# Patient Record
Sex: FEMALE | Race: WHITE | NOT HISPANIC OR LATINO | Employment: FULL TIME | URBAN - METROPOLITAN AREA
[De-identification: names, ages, dates, MRNs, and addresses within clinical notes are randomized per-mention and may not be internally consistent; named-entity substitution may affect disease eponyms.]

---

## 2019-05-14 ENCOUNTER — TRANSFERRED RECORDS (OUTPATIENT)
Dept: HEALTH INFORMATION MANAGEMENT | Facility: CLINIC | Age: 39
End: 2019-05-14

## 2019-05-21 ENCOUNTER — TRANSFERRED RECORDS (OUTPATIENT)
Dept: HEALTH INFORMATION MANAGEMENT | Facility: CLINIC | Age: 39
End: 2019-05-21

## 2019-06-05 ENCOUNTER — TRANSFERRED RECORDS (OUTPATIENT)
Dept: HEALTH INFORMATION MANAGEMENT | Facility: CLINIC | Age: 39
End: 2019-06-05

## 2019-07-02 ENCOUNTER — TRANSFERRED RECORDS (OUTPATIENT)
Dept: HEALTH INFORMATION MANAGEMENT | Facility: CLINIC | Age: 39
End: 2019-07-02

## 2019-07-05 NOTE — TELEPHONE ENCOUNTER
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 Barry Conner [facility], MRI 05/2019, Xrays 03/2019, DEXA 06/2019   DATE RECEIVED: Jul 22, 2019    NOTES STATUS DETAILS   OFFICE NOTE from referring provider Received 5/21/19 Dr. Conner [facility]   OFFICE NOTE from other specialist N/A    DISCHARGE SUMMARY from hospital N/A    DISCHARGE REPORT from the ER N/A    OPERATIVE REPORT N/A    MEDICATION LIST Received    IMPLANT RECORD/STICKER N/A    LABS     CBC/DIFF N/A    CULTURES N/A    INJECTIONS DONE IN RADIOLOGY N/A    NM BONE Received 6/5/19   MRI Received 5/14/19   CT SCAN N/A    XRAYS (IMAGES & REPORTS) Received 3/18/19   TUMOR     PATHOLOGY  Slides & report N/A      07/05/19   3:26 PM  Called [facility] [place] for Dr. Conner's referral coordinator.     07/08/19   10:30 AM  Dr. Conner did not see this patient at [facility]  3:19 PM received recs from Dr. Conner at [facility]. Called [facility] and requested imaging, they will push

## 2019-07-22 ENCOUNTER — PRE VISIT (OUTPATIENT)
Dept: ORTHOPEDICS | Facility: CLINIC | Age: 39
End: 2019-07-22

## 2019-07-22 ENCOUNTER — OFFICE VISIT (OUTPATIENT)
Dept: ORTHOPEDICS | Facility: CLINIC | Age: 39
End: 2019-07-22
Payer: COMMERCIAL

## 2019-07-22 ENCOUNTER — TELEPHONE (OUTPATIENT)
Dept: ORTHOPEDICS | Facility: CLINIC | Age: 39
End: 2019-07-22

## 2019-07-22 ENCOUNTER — ANCILLARY PROCEDURE (OUTPATIENT)
Dept: GENERAL RADIOLOGY | Facility: CLINIC | Age: 39
End: 2019-07-22
Attending: ORTHOPAEDIC SURGERY
Payer: COMMERCIAL

## 2019-07-22 VITALS — BODY MASS INDEX: 40.48 KG/M2 | HEIGHT: 64 IN | WEIGHT: 237.1 LBS

## 2019-07-22 DIAGNOSIS — M87.00 AVN (AVASCULAR NECROSIS OF BONE) (H): ICD-10-CM

## 2019-07-22 DIAGNOSIS — M87.00 AVN (AVASCULAR NECROSIS OF BONE) (H): Primary | ICD-10-CM

## 2019-07-22 RX ORDER — LORAZEPAM 0.5 MG/1
TABLET ORAL
COMMUNITY
Start: 2018-11-28

## 2019-07-22 RX ORDER — OMEPRAZOLE/SODIUM BICARBONATE 20MG-1.1G
CAPSULE ORAL
COMMUNITY
End: 2023-04-14

## 2019-07-22 RX ORDER — ONDANSETRON 4 MG/1
TABLET, FILM COATED ORAL
COMMUNITY

## 2019-07-22 RX ORDER — PRAZOSIN HYDROCHLORIDE 1 MG/1
CAPSULE ORAL
Refills: 1 | COMMUNITY
Start: 2019-05-22 | End: 2023-04-14

## 2019-07-22 RX ORDER — FLUTICASONE PROPIONATE 50 MCG
SPRAY, SUSPENSION (ML) NASAL EVERY 24 HOURS
COMMUNITY
Start: 2019-01-23 | End: 2023-04-14

## 2019-07-22 RX ORDER — OMEPRAZOLE 40 MG/1
CAPSULE, DELAYED RELEASE ORAL
COMMUNITY

## 2019-07-22 RX ORDER — ALBUTEROL SULFATE 2 MG/5ML
SYRUP ORAL
COMMUNITY
End: 2023-04-14

## 2019-07-22 RX ORDER — VENLAFAXINE 75 MG/1
TABLET ORAL
COMMUNITY
End: 2023-04-14

## 2019-07-22 RX ORDER — FLUOXETINE 40 MG/1
CAPSULE ORAL
COMMUNITY
Start: 2018-10-31 | End: 2023-04-14

## 2019-07-22 RX ORDER — SUCRALFATE 1 G/1
TABLET ORAL
COMMUNITY
End: 2023-04-14

## 2019-07-22 ASSESSMENT — ACTIVITIES OF DAILY LIVING (ADL)
ADL_MEAN: INCOMPLETE
ADL_SUBSCALE_SCORE: INCOMPLETE
ADL_SUM: INCOMPLETE

## 2019-07-22 ASSESSMENT — ENCOUNTER SYMPTOMS
STIFFNESS: 1
CHILLS: 0
DEPRESSION: 1
JOINT SWELLING: 1
BLOOD IN STOOL: 0
NERVOUS/ANXIOUS: 1
WEIGHT GAIN: 0
DECREASED CONCENTRATION: 0
WEIGHT LOSS: 0
INCREASED ENERGY: 1
FATIGUE: 1
BLOATING: 0
RECTAL PAIN: 0
POLYPHAGIA: 0
DIARRHEA: 0
VOMITING: 1
ARTHRALGIAS: 1
HEARTBURN: 1
POLYDIPSIA: 0
BOWEL INCONTINENCE: 0
JAUNDICE: 0
NAUSEA: 1
ABDOMINAL PAIN: 0
NIGHT SWEATS: 1
BACK PAIN: 1
INSOMNIA: 1
ALTERED TEMPERATURE REGULATION: 0
HALLUCINATIONS: 0
PANIC: 1
CONSTIPATION: 0
DECREASED APPETITE: 0
FEVER: 0

## 2019-07-22 ASSESSMENT — MIFFLIN-ST. JEOR: SCORE: 1743.23

## 2019-07-22 NOTE — NURSING NOTE
"Chief Complaint   Patient presents with     Consult     Pt. states that she is here for Bilat. Knee Pain. She states Right > Left Knee. Referring:  WILY REESE        39 year old  1980    Ht 1.63 m (5' 4.17\")   Wt 107.5 kg (237 lb 1.6 oz)   BMI 40.48 kg/m      Pharmacy: [facility] in [place], [place]    Allergies   Allergen Reactions     Codeine Anaphylaxis, Difficulty breathing, Hives, Itching, Other (See Comments), Rash and Shortness Of Breath     Comment: Throt swelling, Description:        Imidazole Antifungals Anaphylaxis     Metronidazole Anaphylaxis, Difficulty breathing, Hives, Itching, Other (See Comments), Rash, Shortness Of Breath and Swelling     Comment: Throat swelling, Description:   Comment: Throat swelling, Description:          Current Outpatient Medications   Medication     albuterol (PROVENTIL/VENTOLIN) 2 MG/5ML syrup     Cholecalciferol (D-5000) 5000 units TABS     Docosahexaenoic Acid (PRENATAL DHA PO)     FLUoxetine (PROZAC) 40 MG capsule     fluticasone (FLONASE) 50 MCG/ACT nasal spray     LORazepam (ATIVAN) 0.5 MG tablet     omeprazole (PRILOSEC) 40 MG DR capsule     omeprazole-sodium bicarbonate  MG CAPS per capsule     ondansetron (ZOFRAN) 4 MG tablet     prazosin (MINIPRESS) 1 MG capsule     Sharps Container (SHARPS-A-GATOR LOCKING BRACKET) MISC     sucralfate (CARAFATE) 1 GM tablet     venlafaxine (EFFEXOR) 75 MG tablet     No current facility-administered medications for this visit.          Questionnaires:    Pts. Apt. Was scheduled under a \"NEW HIP\" Slot so questionnaires populated in for HIP, will assign Knee Forms if applicable.     HOOS Hip Dysfunction & Osteoarthritis Outcome Questionnaire    Hip Dysfunction & Osteoarthritis Outcome Score (HOOS), English Version LK 2.0 (Bridgette Last, Raul MARIN, 2003) 7/22/2019   S1. Do you feel grinding, hear clicking or any other type of noise from your hip? Rarely   S2. Difficulties spreading legs wide apart Mild   S3. " Difficulties to stride out when walking Mild   Symptom Count 3   Symptom Sum Incomplete   Symptom Mean Incomplete   Symptom Subscale Score Incomplete   Pain Count 0   Pain Sum Incomplete   Pain Mean Incomplete   Pain Subscale Score Incomplete   ADL Count 0   ADL Sum Incomplete   ADL Mean Incomplete   ADL Subscale Score Incomplete   Sports/Rec Count 0   Sports/Rec Sum Incomplete   Sports/Rec Mean Incomplete   Sports/Rec Subscale Score Incomplete   QOL Count 0   QOL Sum Incomplete   QOL Mean Incomplete   Quality of Life Subscale Score Incomplete                    Promis 10 Assessment    PROMIS 10 7/22/2019   In general, would you say your health is: Fair   In general, would you say your quality of life is: Good   In general, how would you rate your physical health? Poor   In general, how would you rate your mental health, including your mood and your ability to think? Fair   In general, how would you rate your satisfaction with your social activities and relationships? Good   In general, please rate how well you carry out your usual social activities and roles Good   To what extent are you able to carry out your everyday physical activities such as walking, climbing stairs, carrying groceries, or moving a chair? Moderately   How often have you been bothered by emotional problems such as feeling anxious, depressed or irritable? Often   How would you rate your fatigue on average? Severe   How would you rate your pain on average?   0 = No Pain  to  10 = Worst Imaginable Pain 7   In general, would you say your health is: 2   In general, would you say your quality of life is: 3   In general, how would you rate your physical health? 1   In general, how would you rate your mental health, including your mood and your ability to think? 2   In general, how would you rate your satisfaction with your social activities and relationships? 3   In general, please rate how well you carry out your usual social activities and roles.  (This includes activities at home, at work and in your community, and responsibilities as a parent, child, spouse, employee, friend, etc.) 3   To what extent are you able to carry out your everyday physical activities such as walking, climbing stairs, carrying groceries, or moving a chair? 3   In the past 7 days, how often have you been bothered by emotional problems such as feeling anxious, depressed, or irritable? 4   In the past 7 days, how would you rate your fatigue on average? 4   In the past 7 days, how would you rate your pain on average, where 0 means no pain, and 10 means worst imaginable pain? 7   Global Mental Health Score 10   Global Physical Health Score 8   PROMIS TOTAL - SUBSCORES 18

## 2019-07-22 NOTE — LETTER
7/22/2019       RE: Krystle Hernández  [address]  [facility] [address]     Dear Colleague,    Thank you for referring your patient, Krystle Hernández, to the [facility]. Please see a copy of my visit note below.    [facility]  Orthopaedic Surgery, Joint Replacement Consultation  by Mario Wu M.D.    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  We explained that the osteonecrosis in her knees is possibly secondary to her annual use of steroids for asthma over the course of many years, versus her rather heavy use of alcohol.  -We discussed the importance of alcohol cessation  -We would recommend limiting the use of steroids as able  -We discussed possible treatment options including both nonoperative and operative management.  We reassured her that her imaging fails to reveal an obvious subchondral fracture or signs of collapse, and therefore, we would currently recommend ongoing nonoperative management.  -We would encourage activity modification as she is able, and to rest when her knees begin to bother her with activity.  -If her symptoms worsen acutely, she should call our clinic to discuss the plan going forward  -We explained that operative management would involve core decompression of the femoral lesions.  She would like to avoid this if possible, as her pain is not significant at this point.  -We would encourage over-the-counter pain medications for relief as needed, in addition to rest, ice, elevation to  combat swelling  -We will order a screening MRI of her bilateral hips to be completed on outpatient basis  -She is in understanding of the plan and all questions were answered    She will follow-up in 6 - 12 months           History of Present Illness:   39 year old female with past medical history significant for obesity, GERD, asthma, anxiety, PTSD, depression who presents to orthopedic clinic today for evaluation of her bilateral knee pain, right worse than left.  Patient reports that for the last 3 to 4 years she is noticed bilateral intermittent knee pain without obvious trauma resulting in swelling.  These episodes would come and go.  She previously has received bilateral knee corticosteroid injections without relief approximately 3 years ago.  In March of this year she fell while ascending stairs onto the anterior aspect of her knees.  As result she had increased frequency of swelling and aching along her bilateral distal femurs, right worse than left.  Without localized or focal pains, and denies radiation of symptoms.  She denies fevers, chills, night sweats and admits that she is otherwise relatively healthy.  She describes her symptoms as a constant ache that requires intermittent nonweightbearing for relief.  Is not taking any medications for pain relief at this time.  Is not using assistive device for ambulation.    Additionally, the patient admits to using steroids for 3 to 4 weeks either once or twice per year since the age of 10 for her asthma.      Current symptoms:  Problem: Bilateral knee pain, right worse than left  Onset and duration: 3 to 4 years ago, with worsening in March of this year  Awakens from sleep due to sx's:  No  Precipitating Injury:  unclear  Other joints or sites painful:  Yes -admits that her hips have occasionally bothered her in the past, although do not bother her now               Physical Exam:     EXAMINATION pertinent findings:   VITAL SIGNS: There were no vitals taken for  this visit.  There is no height or weight on file to calculate BMI.  RESP: non labored breathing   ABD: benign   SKIN: grossly normal   LYMPHATIC: grossly normal   NEURO: grossly normal   VASCULAR: satisfactory perfusion of all extremities   MUSCULOSKELETAL:   Focused examination of her bilateral lower extremities reveals full active and passive range of motion, circumduction of her hips without limitation by pain.  Able to achieve full flexion, external rotation to 60 degrees and internal rotation to 20 degrees bilaterally.  Full range of motion of her bilateral knees from -5* to 130* flexion, with stability to ligamentous exam bilaterally.  Moderate pain with palpation along the distal right medial femoral condyle.  Without significant pain with palpation along the joint line.  Otherwise neurovascular intact distally bilaterally.                  Data:   All laboratory data reviewed  All imaging studies reviewed by me    MRI right knee -lesion along the medial condyle of the right distal femur with surrounding sclerotic rim and double line sign.  Cortices intact, without aggressive features.  Surrounding bony edema.  Without sign of subchondral or articular collapse.    XR both hips: no evidence of ONFH either fem head    Attending MD (Dr. Mario Wu) Attestation :  This patient was seen and evaluated by me including a history, exam, and interpretation of all imaging and/or lab data.  Either a training physician (resident/fellow), who also saw the patient, or scribe has documented the visit in the attached note.    Mario Wu MD  [employer] Family Professor  Oncology and Adult Reconstructive Surgery  Dept Orthopaedic Surgery, [facility]  [phone] office, [phone] pager  [email or web address]        DATA for DOCUMENTATION:         Past Medical History:   Asthma  PTSD, MDD, anxiety  GERD    Also see scanned health assessment forms.       Past Surgical History:    x2  Nausea with ansthesia          Social History:     Social History     Socioeconomic History     Marital status:      Spouse name: Not on file     Number of children: Not on file     Years of education: Not on file     Highest education level: Not on file   Occupational History     Not on file   Social Needs     Financial resource strain: Not on file     Food insecurity:     Worry: Not on file     Inability: Not on file     Transportation needs:     Medical: Not on file     Non-medical: Not on file   Tobacco Use     Smoking status: Not on file   Substance and Sexual Activity     Alcohol use: Not on file     Drug use: Not on file     Sexual activity: Not on file   Lifestyle     Physical activity:     Days per week: Not on file     Minutes per session: Not on file     Stress: Not on file   Relationships     Social connections:     Talks on phone: Not on file     Gets together: Not on file     Attends Bahai service: Not on file     Active member of club or organization: Not on file     Attends meetings of clubs or organizations: Not on file     Relationship status: Not on file     Intimate partner violence:     Fear of current or ex partner: Not on file     Emotionally abused: Not on file     Physically abused: Not on file     Forced sexual activity: Not on file   Other Topics Concern     Not on file   Social History Narrative     Not on file   Social cigarette use  EtOH 2x per week - 12 beers at a time  No drug use             Family History:     No family history on file.         Medications:     No current outpatient medications on file.     No current facility-administered medications for this visit.               Review of Systems:   A comprehensive 10 point review of systems (constitutional, ENT, cardiac, peripheral vascular, lymphatic, respiratory, GI, , Musculoskeletal, skin, Neurological) was performed and found to be negative except as described in this note.     See intake form completed by patient

## 2019-07-22 NOTE — PROGRESS NOTES
[facility]  Orthopaedic Surgery, Joint Replacement Consultation  by Mario Wu M.D.    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  We explained that the osteonecrosis in her knees is possibly secondary to her annual use of steroids for asthma over the course of many years, versus her rather heavy use of alcohol.  -We discussed the importance of alcohol cessation  -We would recommend limiting the use of steroids as able  -We discussed possible treatment options including both nonoperative and operative management.  We reassured her that her imaging fails to reveal an obvious subchondral fracture or signs of collapse, and therefore, we would currently recommend ongoing nonoperative management.  -We would encourage activity modification as she is able, and to rest when her knees begin to bother her with activity.  -If her symptoms worsen acutely, she should call our clinic to discuss the plan going forward  -We explained that operative management would involve core decompression of the femoral lesions.  She would like to avoid this if possible, as her pain is not significant at this point.  -We would encourage over-the-counter pain medications for relief as needed, in addition to rest, ice, elevation to combat swelling  -We will order a screening MRI of her bilateral hips to be completed on outpatient basis  -She is in understanding of the plan and all questions were answered    She will follow-up in 6 - 12 months           History of Present Illness:   39 year old female  with past medical history significant for obesity, GERD, asthma, anxiety, PTSD, depression who presents to orthopedic clinic today for evaluation of her bilateral knee pain, right worse than left.  Patient reports that for the last 3 to 4 years she is noticed bilateral intermittent knee pain without obvious trauma resulting in swelling.  These episodes would come and go.  She previously has received bilateral knee corticosteroid injections without relief approximately 3 years ago.  In March of this year she fell while ascending stairs onto the anterior aspect of her knees.  As result she had increased frequency of swelling and aching along her bilateral distal femurs, right worse than left.  Without localized or focal pains, and denies radiation of symptoms.  She denies fevers, chills, night sweats and admits that she is otherwise relatively healthy.  She describes her symptoms as a constant ache that requires intermittent nonweightbearing for relief.  Is not taking any medications for pain relief at this time.  Is not using assistive device for ambulation.    Additionally, the patient admits to using steroids for 3 to 4 weeks either once or twice per year since the age of 10 for her asthma.      Current symptoms:  Problem: Bilateral knee pain, right worse than left  Onset and duration: 3 to 4 years ago, with worsening in March of this year  Awakens from sleep due to sx's:  No  Precipitating Injury:  unclear  Other joints or sites painful:  Yes -admits that her hips have occasionally bothered her in the past, although do not bother her now               Physical Exam:     EXAMINATION pertinent findings:   VITAL SIGNS: There were no vitals taken for this visit.  There is no height or weight on file to calculate BMI.  RESP: non labored breathing   ABD: benign   SKIN: grossly normal   LYMPHATIC: grossly normal   NEURO: grossly normal   VASCULAR: satisfactory perfusion of all extremities   MUSCULOSKELETAL:   Focused  examination of her bilateral lower extremities reveals full active and passive range of motion, circumduction of her hips without limitation by pain.  Able to achieve full flexion, external rotation to 60 degrees and internal rotation to 20 degrees bilaterally.  Full range of motion of her bilateral knees from -5* to 130* flexion, with stability to ligamentous exam bilaterally.  Moderate pain with palpation along the distal right medial femoral condyle.  Without significant pain with palpation along the joint line.  Otherwise neurovascular intact distally bilaterally.                  Data:   All laboratory data reviewed  All imaging studies reviewed by me    MRI right knee -lesion along the medial condyle of the right distal femur with surrounding sclerotic rim and double line sign.  Cortices intact, without aggressive features.  Surrounding bony edema.  Without sign of subchondral or articular collapse.    XR both hips: no evidence of ONFH either fem head    Attending MD (Dr. Mario Wu) Attestation :  This patient was seen and evaluated by me including a history, exam, and interpretation of all imaging and/or lab data.  Either a training physician (resident/fellow), who also saw the patient, or scribe has documented the visit in the attached note.    MD Ileana Storey Family Professor  Oncology and Adult Reconstructive Surgery  Dept Orthopaedic Surgery, [facility]  [phone] office, [phone] pager  [email or web address]        DATA for DOCUMENTATION:         Past Medical History:   Asthma  PTSD, MDD, anxiety  GERD    Also see scanned health assessment forms.       Past Surgical History:    x2  Nausea with ansthesia         Social History:     Social History     Socioeconomic History     Marital status:      Spouse name: Not on file     Number of children: Not on file     Years of education: Not on file     Highest education level: Not on file   Occupational History     Not on  file   Social Needs     Financial resource strain: Not on file     Food insecurity:     Worry: Not on file     Inability: Not on file     Transportation needs:     Medical: Not on file     Non-medical: Not on file   Tobacco Use     Smoking status: Not on file   Substance and Sexual Activity     Alcohol use: Not on file     Drug use: Not on file     Sexual activity: Not on file   Lifestyle     Physical activity:     Days per week: Not on file     Minutes per session: Not on file     Stress: Not on file   Relationships     Social connections:     Talks on phone: Not on file     Gets together: Not on file     Attends Taoism service: Not on file     Active member of club or organization: Not on file     Attends meetings of clubs or organizations: Not on file     Relationship status: Not on file     Intimate partner violence:     Fear of current or ex partner: Not on file     Emotionally abused: Not on file     Physically abused: Not on file     Forced sexual activity: Not on file   Other Topics Concern     Not on file   Social History Narrative     Not on file   Social cigarette use  EtOH 2x per week - 12 beers at a time  No drug use             Family History:     No family history on file.         Medications:     No current outpatient medications on file.     No current facility-administered medications for this visit.               Review of Systems:   A comprehensive 10 point review of systems (constitutional, ENT, cardiac, peripheral vascular, lymphatic, respiratory, GI, , Musculoskeletal, skin, Neurological) was performed and found to be negative except as described in this note.     See intake form completed by patient      Answers for HPI/ROS submitted by the patient on 7/22/2019   General Symptoms: Yes  Skin Symptoms: No  HENT Symptoms: No  EYE SYMPTOMS: No  HEART SYMPTOMS: No  LUNG SYMPTOMS: No  INTESTINAL SYMPTOMS: Yes  URINARY SYMPTOMS: No  GYNECOLOGIC SYMPTOMS: No  BREAST SYMPTOMS: No  SKELETAL  SYMPTOMS: Yes  BLOOD SYMPTOMS: No  NERVOUS SYSTEM SYMPTOMS: No  MENTAL HEALTH SYMPTOMS: Yes  Fever: No  Loss of appetite: No  Weight loss: No  Weight gain: No  Fatigue: Yes  Night sweats: Yes  Chills: No  Increased stress: No  Excessive hunger: No  Excessive thirst: No  Feeling hot or cold when others believe the temperature is normal: No  Loss of height: No  Post-operative complications: No  Surgical site pain: No  Hallucinations: No  Change in or Loss of Energy: Yes  Hyperactivity: No  Confusion: No  Heart burn or indigestion: Yes  Nausea: Yes  Vomiting: Yes  Abdominal pain: No  Bloating: No  Constipation: No  Diarrhea: No  Blood in stool: No  Black stools: No  Rectal or Anal pain: No  Fecal incontinence: No  Yellowing of skin or eyes: No  Vomit with blood: No  Change in stools: No  Back pain: Yes  Swollen joints: Yes  Joint pain: Yes  Bone pain: Yes  Joint stiffness: Yes  Bone fracture: Yes  Nervous or Anxious: Yes  Depression: Yes  Trouble sleeping: Yes  Trouble thinking or concentrating: No  Mood changes: Yes  Panic attacks: Yes

## 2020-03-11 ENCOUNTER — HEALTH MAINTENANCE LETTER (OUTPATIENT)
Age: 40
End: 2020-03-11

## 2021-01-03 ENCOUNTER — HEALTH MAINTENANCE LETTER (OUTPATIENT)
Age: 41
End: 2021-01-03

## 2021-04-25 ENCOUNTER — HEALTH MAINTENANCE LETTER (OUTPATIENT)
Age: 41
End: 2021-04-25

## 2021-07-16 ENCOUNTER — RECORDS - HEALTHEAST (OUTPATIENT)
Dept: ADMINISTRATIVE | Facility: CLINIC | Age: 41
End: 2021-07-16

## 2021-10-10 ENCOUNTER — HEALTH MAINTENANCE LETTER (OUTPATIENT)
Age: 41
End: 2021-10-10

## 2021-11-22 ENCOUNTER — TELEPHONE (OUTPATIENT)
Dept: ORTHOPEDICS | Facility: CLINIC | Age: 41
End: 2021-11-22
Payer: COMMERCIAL

## 2021-11-22 DIAGNOSIS — M87.00 AVN (AVASCULAR NECROSIS OF BONE) (H): Primary | ICD-10-CM

## 2021-11-22 NOTE — TELEPHONE ENCOUNTER
[facility] Call Center    Phone Message    May a detailed message be left on voicemail: yes     Reason for Call: Other: Patient would like to talk to Dr Amanda nurse because she has a video appt next week because she has been having alot of struggles with her knee but it is super swollen right now and she can hardly put weight on and it she is not sure she can wait until next week. She is wondering what she should do.      Action Taken: Message routed to:  [facility] ortho     Travel Screening: Not Applicable

## 2021-11-22 NOTE — TELEPHONE ENCOUNTER
Called and spoke to pt. Pt is concerns about her 'femur has collapsed'. ATC encouraged XR being completed to rule out 'collapse' and to be completed prior to appt on 11/29/21. Faxed orders to [phone]. rightfax marked as received/sent. Pt aware we need images prior to 11/29/21.     Denise Humphreys ATC

## 2021-11-22 NOTE — TELEPHONE ENCOUNTER
[facility] Call Center    Phone Message    May a detailed message be left on voicemail: yes     Reason for Call: Other: (R) femur swollen in a lot of pain. Would like direction on what she should do     Action Taken: Patient transferred to: ortho and Message routed to:  [facility] ortho    Travel Screening: Not Applicable

## 2021-11-22 NOTE — TELEPHONE ENCOUNTER
[facility]    Phone Message    May a detailed message be left on voicemail: yes     Reason for Call: Other: Pt was originally scheduled for video visit on 11/29/21.        Because pt lives [place], [place] pt was told she needed to reschedule.     I changed appointment from virtual to in clinic. Since pt prior to Patti's appointment is in clinic - I thought this would be okay.  Pt has been waiting to get in to see Dr. Wu due to pain in hip she is having    Please advise if this needs to be rescheduled.     Action Taken: Message routed to:  [facility] ([facility] ortho    Travel Screening: Not Applicable

## 2021-11-23 NOTE — TELEPHONE ENCOUNTER
ATC called and spoke to pt. Pt stated her son has COVID and she's not allowed in her local clinic unless it's an emergency. Pt confirmed understanding that XR will be completed at [facility]. Pt confirmed appt date/time. All questions answered.     Denise Humphreys ATC

## 2021-11-23 NOTE — TELEPHONE ENCOUNTER
Called pt and LVM reiterating to get imaging that was ordered yesterday so we can get a better understanding on what is going on. I recommended RICE for now until then.

## 2021-12-13 ENCOUNTER — OFFICE VISIT (OUTPATIENT)
Dept: ORTHOPEDICS | Facility: CLINIC | Age: 41
End: 2021-12-13
Payer: COMMERCIAL

## 2021-12-13 ENCOUNTER — ANCILLARY PROCEDURE (OUTPATIENT)
Dept: GENERAL RADIOLOGY | Facility: CLINIC | Age: 41
End: 2021-12-13
Attending: ORTHOPAEDIC SURGERY
Payer: COMMERCIAL

## 2021-12-13 ENCOUNTER — TELEPHONE (OUTPATIENT)
Dept: ORTHOPEDICS | Facility: CLINIC | Age: 41
End: 2021-12-13

## 2021-12-13 VITALS — BODY MASS INDEX: 40.82 KG/M2 | HEIGHT: 65 IN | WEIGHT: 245 LBS

## 2021-12-13 DIAGNOSIS — Z53.9 ERRONEOUS ENCOUNTER--DISREGARD: Primary | ICD-10-CM

## 2021-12-13 DIAGNOSIS — M87.00 AVN (AVASCULAR NECROSIS OF BONE) (H): ICD-10-CM

## 2021-12-13 DIAGNOSIS — F40.240 CLAUSTROPHOBIA: Primary | ICD-10-CM

## 2021-12-13 DIAGNOSIS — M25.561 ARTHRALGIA OF RIGHT LOWER LEG: Primary | ICD-10-CM

## 2021-12-13 DIAGNOSIS — M87.9 OSTEONECROSIS (H): ICD-10-CM

## 2021-12-13 LAB
APPEARANCE FLD: ABNORMAL
COLOR FLD: YELLOW
CRYSTALS SNV MICRO: ABNORMAL
LYMPHOCYTES NFR FLD MANUAL: 1 %
MONOS+MACROS NFR FLD MANUAL: 20 %
NEUTS BAND NFR FLD MANUAL: 80 %
WBC # FLD AUTO: 8538 /UL

## 2021-12-13 PROCEDURE — 89060 EXAM SYNOVIAL FLUID CRYSTALS: CPT | Performed by: PATHOLOGY

## 2021-12-13 PROCEDURE — 73560 X-RAY EXAM OF KNEE 1 OR 2: CPT | Mod: RT | Performed by: RADIOLOGY

## 2021-12-13 PROCEDURE — 87075 CULTR BACTERIA EXCEPT BLOOD: CPT | Mod: 90 | Performed by: PATHOLOGY

## 2021-12-13 PROCEDURE — 20610 DRAIN/INJ JOINT/BURSA W/O US: CPT | Mod: RT | Performed by: ORTHOPAEDIC SURGERY

## 2021-12-13 PROCEDURE — 99000 SPECIMEN HANDLING OFFICE-LAB: CPT | Performed by: PATHOLOGY

## 2021-12-13 PROCEDURE — 73523 X-RAY EXAM HIPS BI 5/> VIEWS: CPT | Mod: GC | Performed by: RADIOLOGY

## 2021-12-13 PROCEDURE — 89051 BODY FLUID CELL COUNT: CPT | Mod: 90 | Performed by: PATHOLOGY

## 2021-12-13 PROCEDURE — 99214 OFFICE O/P EST MOD 30 MIN: CPT | Mod: 25 | Performed by: ORTHOPAEDIC SURGERY

## 2021-12-13 PROCEDURE — 87070 CULTURE OTHR SPECIMN AEROBIC: CPT | Mod: 90 | Performed by: PATHOLOGY

## 2021-12-13 RX ORDER — CITALOPRAM HYDROBROMIDE 40 MG/1
TABLET ORAL
COMMUNITY
Start: 2020-10-13 | End: 2023-04-14

## 2021-12-13 RX ORDER — DIAZEPAM 5 MG
TABLET ORAL
Qty: 2 TABLET | Refills: 0 | Status: CANCELLED | OUTPATIENT
Start: 2021-12-13

## 2021-12-13 RX ORDER — HYDROCHLOROTHIAZIDE 25 MG/1
TABLET ORAL
COMMUNITY
Start: 2021-09-14 | End: 2023-04-14

## 2021-12-13 RX ORDER — BUPROPION HYDROCHLORIDE 150 MG/1
TABLET ORAL
COMMUNITY
Start: 2020-10-13 | End: 2023-04-14

## 2021-12-13 RX ORDER — FLUTICASONE PROPIONATE AND SALMETEROL 55; 14 UG/1; UG/1
POWDER, METERED RESPIRATORY (INHALATION)
COMMUNITY
Start: 2021-09-14 | End: 2023-04-14

## 2021-12-13 RX ORDER — METFORMIN HCL 500 MG
TABLET, EXTENDED RELEASE 24 HR ORAL
COMMUNITY
End: 2023-04-14

## 2021-12-13 RX ORDER — DIAZEPAM 5 MG
TABLET ORAL
Qty: 2 TABLET | Refills: 0 | Status: SHIPPED | OUTPATIENT
Start: 2021-12-13

## 2021-12-13 ASSESSMENT — MIFFLIN-ST. JEOR: SCORE: 1781.57

## 2021-12-13 NOTE — LETTER
12/13/2021         RE: Krystle Hernández  [address]  [facility] [address]        Dear Colleague,    Thank you for referring your patient, Krystle Hernández, to the [facility] [place]. Please see a copy of my visit note below.        [facility]  Orthopaedic Surgery, Joint Replacement Consultation  by Mario Wu M.D.    Krystle Hernández MRN# [id number]   Age: 39 year old YOB: 1980     Requesting physician: Barry Conner  No Ref-Primary, Physician       Background history:  1.  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  Asthma      41-year-old woman seen for evaluation of osteonecrosis of her distal femoral identified several years ago.  Have not seen her in over 2 years as she did not return for follow-up stating she was busy taking care of her father who passed away from lung carcinoma.  I advised her to undergo screening MRI examination both hips which she never completed.    She notes that she developed spontaneous onset of swelling of the right knee joint with marked pain and discomfort over the past several weeks.  At times it is warm.  She denies any specific injury or precipitating event.  She has been limping while at work as a .  She denies being at risk for any STDs.    On examination:  Marked antalgic gait on the right side.  Pain-free range of motion of the right hip joint.  Right knee has large effusion with ballotable patella but no increased warmth or erythema.  Collateral ligaments are stable as are cruciate ligaments.  Range of motion 5 to 80 degrees only.    Radiographs demonstrate no evidence of subchondral collapse although previous evidence of osteonecrosis along the distal femur in the metaphyseal bone is evident.    Procedure note:  Under sterile precautions, the right knee joint was aspirated and 45 cc of turbid semicloudy yellow-orange fluid was obtained from the knee joint.  Aerobic  anaerobic cultures, crystal exam and cell count will be sent.  I also injected 5 cc of Celestone Soluspan steroid into the knee joint along with 2 cc of 1% Xylocaine.           Assessment and Plan:   Assessment:  Spontaneous right knee effusion in setting of bilateral knee osteonecrosis possibly secondary to steroid versus alcohol use.  In absence of subchondral fracture, must rule out infectious etiologies or crystalline arthropathy.     Plan:  1.  Aspiration of right knee joint as noted above with testing ordered  2.  MRI examination of right knee to look for subchondral fracture and status of osteonecrosis  3.  Screening MRI both femoral heads for osteonecrosis  4.  Advised patient to refrain from all forms of alcohol as she states she still drinks once a week 4+ beers or wine.  5.  Advised use of a cane and refraining activities until her symptoms resolve.  6.  Lyme titer testing  7.  Results will be released on [address].    She will follow-up in 6 - 12 months     Mario Wu MD  [facility] Family Professor  Oncology and Adult Reconstructive Surgery  Dept Orthopaedic Surgery, [facility]  [phone] office, [phone] pager  [email or web address]    Total combined visit time and work time before and after clinic visit = 40 min        DATA for DOCUMENTATION:         Past Medical History:   Asthma  PTSD, MDD, anxiety  GERD    Also see scanned health assessment forms.       Past Surgical History:    x2  Nausea with ansthesia         Social History:     Social History     Socioeconomic History     Marital status:      Spouse name: Not on file     Number of children: Not on file     Years of education: Not on file     Highest education level: Not on file   Occupational History     Not on file   Tobacco Use     Smoking status: Not on file     Smokeless tobacco: Not on file   Substance and Sexual Activity     Alcohol use: Not on file     Drug use: Not on file     Sexual activity: Not on file    Other Topics Concern     Not on file   Social History Narrative     Not on file     Social Determinants of Health     Financial Resource Strain: Not on file   Food Insecurity: Not on file   Transportation Needs: Not on file   Physical Activity: Not on file   Stress: Not on file   Social Connections: Not on file   Intimate Partner Violence: Not on file   Housing Stability: Not on file   Social cigarette use  EtOH 2x per week - 12 beers at a time  No drug use             Family History:     No family history on file.         Medications:     Current Outpatient Medications   Medication Sig     buPROPion (WELLBUTRIN XL) 150 MG 24 hr tablet bupropion HCl  mg 24 hr tablet, extended release   TAKE 1 TABLET BY MOUTH ONCE DAILY     Cholecalciferol (D-5000) 5000 units TABS Take 5,000 Units by mouth     citalopram (CELEXA) 40 MG tablet citalopram 40 mg tablet   TAKE 1 TABLET BY MOUTH ONCE DAILY     fluticasone-salmeterol (AIRDUO RESPICLICK) 55-14 MCG/ACT inhaler fluticasone 55 mcg-salmeterol 14 mcg/actuation breath activated powder     hydrochlorothiazide (HYDRODIURIL) 25 MG tablet hydrochlorothiazide 25 mg tablet   TAKE 1 TABLET BY MOUTH ONCE DAILY     LORazepam (ATIVAN) 0.5 MG tablet lorazepam 0.5 mg tablet   1-2 tablet twice daily for severe anxiety   One month supply     metFORMIN (GLUCOPHAGE-XR) 500 MG 24 hr tablet metformin  mg tablet,extended release 24 hr     omeprazole (PRILOSEC) 40 MG DR capsule omeprazole 40 mg capsule,delayed release     ondansetron (ZOFRAN) 4 MG tablet      prazosin (MINIPRESS) 1 MG capsule TAKE ONE CAPSULE BY MOUTH 30-60 MINUTES BEFORE BEDTIME.  MAY INCREASE TO 2 CAPS AT BEDTIME AFTER  3 DAY.  THEN MAY INCREASE DOSE AS NEEDED W     albuterol (PROVENTIL/VENTOLIN) 2 MG/5ML syrup  (Patient not taking: Reported on 12/13/2021)     Docosahexaenoic Acid (PRENATAL DHA PO)  (Patient not taking: Reported on 12/13/2021)     FLUoxetine (PROZAC) 40 MG capsule fluoxetine 40 mg capsule   1 tablet  by mouth daily (Patient not taking: Reported on 12/13/2021)     fluticasone (FLONASE) 50 MCG/ACT nasal spray every 24 hours (Patient not taking: Reported on 12/13/2021)     omeprazole-sodium bicarbonate  MG CAPS per capsule  (Patient not taking: Reported on 12/13/2021)     Sharps Container (SHARPS-A-GATOR LOCKING BRACKET) MISC CPAP for home use at pressure of 12-20.  Heated humidifier x1, Humidifier chamber x1, Heated tubing x1, Full face mask with cushion x1, Headgear x1, Filters: Disposable x1 pack, Reusable x1pk, Length of Need: 99 months, Frequency of use: Daily  Refills: 11. (Patient not taking: Reported on 12/13/2021)     sucralfate (CARAFATE) 1 GM tablet sucralfate 1 gram tablet (Patient not taking: Reported on 12/13/2021)     venlafaxine (EFFEXOR) 75 MG tablet  (Patient not taking: Reported on 12/13/2021)     No current facility-administered medications for this visit.              Review of Systems:   A comprehensive 10 point review of systems (constitutional, ENT, cardiac, peripheral vascular, lymphatic, respiratory, GI, , Musculoskeletal, skin, Neurological) was performed and found to be negative except as described in this note.     See intake form completed by patient      Answers for HPI/ROS submitted by the patient on 7/22/2019   General Symptoms: Yes  Skin Symptoms: No  HENT Symptoms: No  EYE SYMPTOMS: No  HEART SYMPTOMS: No  LUNG SYMPTOMS: No  INTESTINAL SYMPTOMS: Yes  URINARY SYMPTOMS: No  GYNECOLOGIC SYMPTOMS: No  BREAST SYMPTOMS: No  SKELETAL SYMPTOMS: Yes  BLOOD SYMPTOMS: No  NERVOUS SYSTEM SYMPTOMS: No  MENTAL HEALTH SYMPTOMS: Yes  Fever: No  Loss of appetite: No  Weight loss: No  Weight gain: No  Fatigue: Yes  Night sweats: Yes  Chills: No  Increased stress: No  Excessive hunger: No  Excessive thirst: No  Feeling hot or cold when others believe the temperature is normal: No  Loss of height: No  Post-operative complications: No  Surgical site pain: No  Hallucinations: No  Change in or  Loss of Energy: Yes  Hyperactivity: No  Confusion: No  Heart burn or indigestion: Yes  Nausea: Yes  Vomiting: Yes  Abdominal pain: No  Bloating: No  Constipation: No  Diarrhea: No  Blood in stool: No  Black stools: No  Rectal or Anal pain: No  Fecal incontinence: No  Yellowing of skin or eyes: No  Vomit with blood: No  Change in stools: No  Back pain: Yes  Swollen joints: Yes  Joint pain: Yes  Bone pain: Yes  Joint stiffness: Yes  Bone fracture: Yes  Nervous or Anxious: Yes  Depression: Yes  Trouble sleeping: Yes  Trouble thinking or concentrating: No  Mood changes: Yes  Panic attacks: Yes

## 2021-12-13 NOTE — ADDENDUM NOTE
awake alert and comfortable       70 years old  female who typically lives in [place] and gets her renal therapy in [place] is admitted with 2 months history of right upper quadrant discomfort and a recent history of vomiting a couple of times not associated any fever chills orthopnea PND leg swelling leg pain hematuria hematemesis or hemoptysis or hematochezia there is no history of any other radiation to the pain.  In the emergency room she was noted to have elevated liver functions ultrasound of the gallbladder was unremarkable and liver specialist had seen her here at the hospital and recommended an outpatient transfer.  A chest x-ray shows a small sided effusion and a Rene catheter right subclavian was noted.  There are no other significant contributing symptoms except weakness and confusional state at times.      Past Medical History: Diabetes, end-stage renal disease, hemodialysis    Past Surgical History: AV fistula formation 1 week ago    Family History: Denies some history of GI malignancies or diseases     Social History: Denies alcohol or tobacco,     Allergies:  Allergies (1) Active Reaction  NKA None Documented    Patient History: Home Medications  amLODIPine 5 mg =, PO, qDay  09/25/2018 14:05  calcium carbonate (calcium carbonate 600 mg oral tablet, chewable) 600 mg = 1 tab, PO, BID  09/25/2018 14:05  folic acid (folic acid 0.4 mg oral tablet) 0.4 mg = 1 tab, PO, Tab, qDay  09/25/2018 14:05  multivitamin 1 tab, PO, qDay, multivitamin with vitamin B oral complex  09/25/2018 14:05    .    Vital Signs (last 24 hrs)_____ Last Charted___________Minimum____________ Maximum____________  Temp    97.9  (OCT 03 08:00) 97.9  (OCT 03 08:00) 98.4  (OCT 03 00:00)  Resp Rate       H 23 (OCT 03 13:30) L 12 (OCT 02 17:30) H 32 (OCT 02 15:45)  SBP    96  (OCT 03 13:30) L 80 (OCT 03 07:00) H 167 (OCT 02 15:15)  DBP    L 30 (OCT 03 13:30) L 12 (OCT 03 07:00) 90  (OCT 02 15:15)        awake alert  Addended by: TIANA BELLO on: 12/13/2021 07:58 AM     Modules accepted: Orders     appropriate on nasal accula   perrl clear rrr soft on dialysis      rt and left cath 9/28  FINDINGS :  RHC :   RAMP : 30 mm  RV( S/D/M) : 64/14/33 mm  PA ( S/D/M): 65/33/45  mm  PCWMP : 32 mm  C.O (Kade) : 4.1 L/Min   C.O ( Thermo) : 3.3   Large V waves were seen on the tracing signifying volume overload     LHC :  1- Moderate  coronary calcification  2- Right dominant system  3- LM :  Mild Luminal irregularities .Divides in LAD and LCx  4- LAD : Moderate diffuse non obstructive plaque , No focal stenosis   5- LCX :  Mild to moderate  non obstructive plaque  6- RCA : Non obstructive Plaque   7- LVEDP :  30  mm , No gradient across the AV ,   8- LV gram :Severely reduced LVSF      ASSESSMENT :  Diffuse moderate non obstructive CAD with reduced LVSF and markedly eleavted LVEDP and PCWP         Impression  No clear explanation to her presentation is yet determined by the gastroenterologist there is evidence of elevated liver functions the ultrasound the liver showed no evidence of stones of any significance or bowel obstruction there is no mention of any evidence for Budd-Chiari  CoagulopathyCould be related to liver disease  Renal failure on dialysis  The effusion is most likely related to the above  Acidemia secondary to renal failure and possible sepsis  doubt TTP    9.26  cardiogenic shock with EF <25%  possible ovelying sepsis  shock  renal failure  Acidemia on above basis  rt femoral central line  mixed acid base s  resp alk secondary to vent setting  elevated LFT could have been related to above    9.27  acid base disturbance  on CVVHD  cardiac cath today  off stress dose steriods , no evidence for adrenal insuff as cortisol above >40    9.28  cath today     9.29  FINDINGS :  RHC :   RAMP : 30 mm  RV( S/D/M) : 64/14/33 mm  PA ( S/D/M): 65/33/45  mm  PCWMP : 32 mm  C.O (Kade) : 4.1 L/Min   C.O ( Thermo) : 3.3   Large V waves were seen on the tracing signifying volume overload     LHC :  1- Moderate  coronary  calcification  2- Right dominant system  3- LM :  Mild Luminal irregularities .Divides in LAD and LCx  4- LAD : Moderate diffuse non obstructive plaque , No focal stenosis   5- LCX :  Mild to moderate  non obstructive plaque  6- RCA : Non obstructive Plaque   7- LVEDP :  30  mm , No gradient across the AV ,   8- LV gram :Severely reduced LVSF      ASSESSMENT :  Diffuse moderate non obstructive CAD with reduced LVSF and markedly eleavted LVEDP and PCWP       9.30  ICU - metabolic encephalopathy   r/o brain bleed   weaning trials and lethargy / agitation  evidenceo PUL HTN most likely related to LV dysfunction      9.30  ICU - metabolic encephalopathy   r/o brain bleed     10.1  encephalopatht  CITLALY today  sinusists per ID to address   CXR retrocardiac infiltrated and effusion    10.2  extubate   use bipap if needed  visited after extubation as well    10.3  off vent 24 hours   still on minimal vasopressors  passed swallow eval      Plan  oral diet  may transfer off unit to tele onc off vasopressors  discussed with Rn 35 min  DVT and ulcer prophylaxis  Sleep study as outpatient details and risks associated with the patient  Renal. ID, Cardiology, GI and primary is following

## 2021-12-13 NOTE — PROGRESS NOTES
[facility]  Orthopaedic Surgery, Joint Replacement Consultation  by Mario Wu M.D.    Krystle Hernández MRN# [id number]   Age: 39 year old YOB: 1980     Requesting physician: Barry Conner  No Ref-Primary, Physician       Background history:  1.  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  Asthma      41-year-old woman seen for evaluation of osteonecrosis of her distal femoral identified several years ago.  Have not seen her in over 2 years as she did not return for follow-up stating she was busy taking care of her father who passed away from lung carcinoma.  I advised her to undergo screening MRI examination both hips which she never completed.    She notes that she developed spontaneous onset of swelling of the right knee joint with marked pain and discomfort over the past several weeks.  At times it is warm.  She denies any specific injury or precipitating event.  She has been limping while at work as a .  She denies being at risk for any STDs.    On examination:  Marked antalgic gait on the right side.  Pain-free range of motion of the right hip joint.  Right knee has large effusion with ballotable patella but no increased warmth or erythema.  Collateral ligaments are stable as are cruciate ligaments.  Range of motion 5 to 80 degrees only.    Radiographs demonstrate no evidence of subchondral collapse although previous evidence of osteonecrosis along the distal femur in the metaphyseal bone is evident.    Procedure note:  Under sterile precautions, the right knee joint was aspirated and 45 cc of turbid semicloudy yellow-orange fluid was obtained from the knee joint.  Aerobic anaerobic cultures, crystal exam and cell count will be sent.  I also injected 5 cc of Celestone Soluspan steroid into the knee joint along with 2 cc of 1% Xylocaine.           Assessment and Plan:   Assessment:  Spontaneous right knee effusion in setting  of bilateral knee osteonecrosis possibly secondary to steroid versus alcohol use.  In absence of subchondral fracture, must rule out infectious etiologies or crystalline arthropathy.     Plan:  1.  Aspiration of right knee joint as noted above with testing ordered  2.  MRI examination of right knee to look for subchondral fracture and status of osteonecrosis  3.  Screening MRI both femoral heads for osteonecrosis  4.  Advised patient to refrain from all forms of alcohol as she states she still drinks once a week 4+ beers or wine.  5.  Advised use of a cane and refraining activities until her symptoms resolve.  6.  Lyme titer testing  7.  Results will be released on [address].    She will follow-up in 6 - 12 months     Mario Wu MD  [facility] Family Professor  Oncology and Adult Reconstructive Surgery  Dept Orthopaedic Surgery, [facility]  [phone] office, [phone] pager  [email or web address]    Total combined visit time and work time before and after clinic visit = 40 min        DATA for DOCUMENTATION:         Past Medical History:   Asthma  PTSD, MDD, anxiety  GERD    Also see scanned health assessment forms.       Past Surgical History:    x2  Nausea with ansthesia         Social History:     Social History     Socioeconomic History     Marital status:      Spouse name: Not on file     Number of children: Not on file     Years of education: Not on file     Highest education level: Not on file   Occupational History     Not on file   Tobacco Use     Smoking status: Not on file     Smokeless tobacco: Not on file   Substance and Sexual Activity     Alcohol use: Not on file     Drug use: Not on file     Sexual activity: Not on file   Other Topics Concern     Not on file   Social History Narrative     Not on file     Social Determinants of Health     Financial Resource Strain: Not on file   Food Insecurity: Not on file   Transportation Needs: Not on file   Physical Activity: Not on file    Stress: Not on file   Social Connections: Not on file   Intimate Partner Violence: Not on file   Housing Stability: Not on file   Social cigarette use  EtOH 2x per week - 12 beers at a time  No drug use             Family History:     No family history on file.         Medications:     Current Outpatient Medications   Medication Sig     buPROPion (WELLBUTRIN XL) 150 MG 24 hr tablet bupropion HCl  mg 24 hr tablet, extended release   TAKE 1 TABLET BY MOUTH ONCE DAILY     Cholecalciferol (D-5000) 5000 units TABS Take 5,000 Units by mouth     citalopram (CELEXA) 40 MG tablet citalopram 40 mg tablet   TAKE 1 TABLET BY MOUTH ONCE DAILY     fluticasone-salmeterol (AIRDUO RESPICLICK) 55-14 MCG/ACT inhaler fluticasone 55 mcg-salmeterol 14 mcg/actuation breath activated powder     hydrochlorothiazide (HYDRODIURIL) 25 MG tablet hydrochlorothiazide 25 mg tablet   TAKE 1 TABLET BY MOUTH ONCE DAILY     LORazepam (ATIVAN) 0.5 MG tablet lorazepam 0.5 mg tablet   1-2 tablet twice daily for severe anxiety   One month supply     metFORMIN (GLUCOPHAGE-XR) 500 MG 24 hr tablet metformin  mg tablet,extended release 24 hr     omeprazole (PRILOSEC) 40 MG DR capsule omeprazole 40 mg capsule,delayed release     ondansetron (ZOFRAN) 4 MG tablet      prazosin (MINIPRESS) 1 MG capsule TAKE ONE CAPSULE BY MOUTH 30-60 MINUTES BEFORE BEDTIME.  MAY INCREASE TO 2 CAPS AT BEDTIME AFTER  3 DAY.  THEN MAY INCREASE DOSE AS NEEDED W     albuterol (PROVENTIL/VENTOLIN) 2 MG/5ML syrup  (Patient not taking: Reported on 12/13/2021)     Docosahexaenoic Acid (PRENATAL DHA PO)  (Patient not taking: Reported on 12/13/2021)     FLUoxetine (PROZAC) 40 MG capsule fluoxetine 40 mg capsule   1 tablet by mouth daily (Patient not taking: Reported on 12/13/2021)     fluticasone (FLONASE) 50 MCG/ACT nasal spray every 24 hours (Patient not taking: Reported on 12/13/2021)     omeprazole-sodium bicarbonate  MG CAPS per capsule  (Patient not taking:  Reported on 12/13/2021)     Sharps Container (SHARPS-A-GATOR LOCKING BRACKET) MISC CPAP for home use at pressure of 12-20.  Heated humidifier x1, Humidifier chamber x1, Heated tubing x1, Full face mask with cushion x1, Headgear x1, Filters: Disposable x1 pack, Reusable x1pk, Length of Need: 99 months, Frequency of use: Daily  Refills: 11. (Patient not taking: Reported on 12/13/2021)     sucralfate (CARAFATE) 1 GM tablet sucralfate 1 gram tablet (Patient not taking: Reported on 12/13/2021)     venlafaxine (EFFEXOR) 75 MG tablet  (Patient not taking: Reported on 12/13/2021)     No current facility-administered medications for this visit.              Review of Systems:   A comprehensive 10 point review of systems (constitutional, ENT, cardiac, peripheral vascular, lymphatic, respiratory, GI, , Musculoskeletal, skin, Neurological) was performed and found to be negative except as described in this note.     See intake form completed by patient      Answers for HPI/ROS submitted by the patient on 7/22/2019   General Symptoms: Yes  Skin Symptoms: No  HENT Symptoms: No  EYE SYMPTOMS: No  HEART SYMPTOMS: No  LUNG SYMPTOMS: No  INTESTINAL SYMPTOMS: Yes  URINARY SYMPTOMS: No  GYNECOLOGIC SYMPTOMS: No  BREAST SYMPTOMS: No  SKELETAL SYMPTOMS: Yes  BLOOD SYMPTOMS: No  NERVOUS SYSTEM SYMPTOMS: No  MENTAL HEALTH SYMPTOMS: Yes  Fever: No  Loss of appetite: No  Weight loss: No  Weight gain: No  Fatigue: Yes  Night sweats: Yes  Chills: No  Increased stress: No  Excessive hunger: No  Excessive thirst: No  Feeling hot or cold when others believe the temperature is normal: No  Loss of height: No  Post-operative complications: No  Surgical site pain: No  Hallucinations: No  Change in or Loss of Energy: Yes  Hyperactivity: No  Confusion: No  Heart burn or indigestion: Yes  Nausea: Yes  Vomiting: Yes  Abdominal pain: No  Bloating: No  Constipation: No  Diarrhea: No  Blood in stool: No  Black stools: No  Rectal or Anal pain: No  Fecal  incontinence: No  Yellowing of skin or eyes: No  Vomit with blood: No  Change in stools: No  Back pain: Yes  Swollen joints: Yes  Joint pain: Yes  Bone pain: Yes  Joint stiffness: Yes  Bone fracture: Yes  Nervous or Anxious: Yes  Depression: Yes  Trouble sleeping: Yes  Trouble thinking or concentrating: No  Mood changes: Yes  Panic attacks: Yes

## 2021-12-13 NOTE — NURSING NOTE
[facility] [address] [address]  [phone]  Dept: [phone]  ______________________________________________________________________________    Patient: Krystle Hernández   : 1980   MRN: [id number]   2021    INVASIVE PROCEDURE SAFETY CHECKLIST    Date: 2021   Procedure:Right Knee Aspiration/ Right knee injection   Patient Name: Krystle Hernández  MRN: [id number]  YOB: 1980    Action: Complete sections as appropriate. Any discrepancy results in a HARD COPY until resolved.     PRE PROCEDURE:  Patient ID verified with 2 identifiers (name and  or MRN): Yes  Procedure and site verified with patient/designee (when able): Yes  Accurate consent documentation in medical record: Yes  H&P (or appropriate assessment) documented in medical record: Yes  H&P must be up to 20 days prior to procedure and updates within 24 hours of procedure as applicable: Yes  Relevant diagnostic and radiology test results appropriately labeled and displayed as applicable: Yes  Procedure site(s) marked with provider initials: NA    TIMEOUT:  Time-Out performed immediately prior to starting procedure, including verbal and active participation of all team members addressing the following:Yes  * Correct patient identify  * Confirmed that the correct side and site are marked  * An accurate procedure consent form  * Agreement on the procedure to be done  * Correct patient position  * Relevant images and results are properly labeled and appropriately displayed  * The need to administer antibiotics or fluids for irrigation purposes during the procedure as applicable   * Safety precautions based on patient history or medication use    DURING PROCEDURE: Verification of correct person, site, and procedures any time the responsibility for care of the patient is transferred to another member of the care team.       The following medications  were given:         Prior to injection, verified patient identity using patient's name and date of birth.  Due to injection administration, patient instructed to remain in clinic for 15 minutes  afterwards, and to report any adverse reaction to me immediately.    Joint injection was performed.    Medication Name: Celestone  NDC 5169-7578-73  Drug Amount Wasted:  Yes: 1 mg/ml   Vial/Syringe: Single dose vial  Expiration Date:  10/1/2022    Medication Name Liocaine NDC 43093-899-14  Lot: [id number]  EXP: 9/1/2025    Scribed by Marcin Alford, SAEED for Dr. Wu on December 13, 2021 at 0745 based on the provider's statements to me.     Marcin Alford, EMT

## 2021-12-13 NOTE — TELEPHONE ENCOUNTER
Message left for Patti after message left for this RN.  Adjusted scheduling of imaging so all MRI's are on one day with one valium administration.  Message left regarding preliminary lab results and potential  Understanding of what those results mean.  Encouraged Patti to call back to confirm a follow up appt with Dr. Wu for early [place].    Senia Horne, BSN, RN  RN Care Coordinator, Dr. Bryce SANCHEZ [facility]

## 2021-12-13 NOTE — TELEPHONE ENCOUNTER
Follow up call to Patti to discuss lab test Dr. Wu would like done when she is at the [facility] for her MRI's.  Also discussed usage of prozac and interaction with valium. Patti stated she has not taken prozac for a couple years/  Patti will contact this RN once she has scheduled her MRI's so the lab and follow up appts can be scheduled and coordinated.    GREYSON Beltran, RN  RN Care Coordinator, Dr. Wu  [facility]

## 2021-12-18 LAB — BACTERIA SNV CULT: NO GROWTH

## 2021-12-27 LAB — BACTERIA SNV CULT: NORMAL

## 2021-12-30 ENCOUNTER — ANCILLARY PROCEDURE (OUTPATIENT)
Dept: MRI IMAGING | Facility: CLINIC | Age: 41
End: 2021-12-30
Attending: ORTHOPAEDIC SURGERY
Payer: COMMERCIAL

## 2021-12-30 DIAGNOSIS — M87.9 OSTEONECROSIS (H): Primary | ICD-10-CM

## 2021-12-30 DIAGNOSIS — M87.9 OSTEONECROSIS (H): ICD-10-CM

## 2021-12-30 PROCEDURE — 73721 MRI JNT OF LWR EXTRE W/O DYE: CPT | Mod: 52 | Performed by: RADIOLOGY

## 2022-01-21 DIAGNOSIS — Z11.59 ENCOUNTER FOR SCREENING FOR OTHER VIRAL DISEASES: Primary | ICD-10-CM

## 2022-02-01 ENCOUNTER — TRANSFERRED RECORDS (OUTPATIENT)
Dept: HEALTH INFORMATION MANAGEMENT | Facility: CLINIC | Age: 42
End: 2022-02-01
Payer: COMMERCIAL

## 2022-02-01 ENCOUNTER — ANESTHESIA EVENT (OUTPATIENT)
Dept: SURGERY | Facility: CLINIC | Age: 42
End: 2022-02-01
Payer: COMMERCIAL

## 2022-02-01 ASSESSMENT — LIFESTYLE VARIABLES: TOBACCO_USE: 1

## 2022-02-01 NOTE — ANESTHESIA PREPROCEDURE EVALUATION
Anesthesia Pre-Procedure Evaluation    Patient: Krystle Hernández   MRN: [id number] : 1980        Preoperative Diagnosis: Osteonecrosis (H) [M87.9]    Procedure : Procedure(s):  ANESTHESIA OUT OF OR MAGNETIC RESONANCE IMAGING OF RIGHT HIP  AND LEFT HIP WITHOUT CONTRAST,RIGHT KNEE WITHOUT CONTRAST@1200          Past Medical History:   Diagnosis Date     Anxiety      PONV (postoperative nausea and vomiting)       Past Surgical History:   Procedure Laterality Date     ORTHOPEDIC SURGERY        Allergies   Allergen Reactions     Codeine Anaphylaxis, Difficulty breathing, Hives, Itching, Other (See Comments), Rash and Shortness Of Breath     Comment: Throt swelling, Description:        Imidazole Antifungals Anaphylaxis     Metronidazole Anaphylaxis, Difficulty breathing, Hives, Itching, Other (See Comments), Rash, Shortness Of Breath and Swelling     Comment: Throat swelling, Description:   Comment: Throat swelling, Description:         Social History     Tobacco Use     Smoking status: Current Some Day Smoker     Smokeless tobacco: Never Used   Substance Use Topics     Alcohol use: Yes     Comment: social       Wt Readings from Last 1 Encounters:   21 111.1 kg (245 lb)        Anesthesia Evaluation            ROS/MED HX  ENT/Pulmonary:     (+) sleep apnea, tobacco use, Current use, asthma     Neurologic:       Cardiovascular:     (+) Dyslipidemia hypertension-----    METS/Exercise Tolerance:     Hematologic:       Musculoskeletal: Comment: osteonecrosis      GI/Hepatic:     (+) GERD, hiatal hernia,     Renal/Genitourinary:       Endo:     (+) Not using insulin, Obesity,     Psychiatric/Substance Use:     (+) psychiatric history anxiety and depression (PTSD)     Infectious Disease:       Malignancy:       Other:            Physical Exam    Airway        Mallampati: II   TM distance: > 3 FB   Neck ROM: full   Mouth opening: > 3 cm    Respiratory Devices and Support         Dental  no notable dental history          Cardiovascular   cardiovascular exam normal          Pulmonary   pulmonary exam normal                OUTSIDE LABS:  CBC: No results found for: WBC, HGB, HCT, PLT  BMP: No results found for: NA, POTASSIUM, CHLORIDE, CO2, BUN, CR, GLC  COAGS: No results found for: PTT, INR, FIBR  POC: No results found for: BGM, HCG, HCGS  HEPATIC: No results found for: ALBUMIN, PROTTOTAL, ALT, AST, GGT, ALKPHOS, BILITOTAL, BILIDIRECT, MARIA  OTHER: No results found for: PH, LACT, A1C, CATINA, PHOS, MAG, LIPASE, AMYLASE, TSH, T4, T3, CRP, SED    Anesthesia Plan    ASA Status:  3   NPO Status:  NPO Appropriate    Anesthesia Type: General.     - Airway: ETT   Induction: Intravenous.   Maintenance: Balanced.        Consents    Anesthesia Plan(s) and associated risks, benefits, and realistic alternatives discussed. Questions answered and patient/representative(s) expressed understanding.    - Discussed:     - Discussed with:  Patient      - Extended Intubation/Ventilatory Support Discussed: No.      - Patient is DNR/DNI Status: No    Use of blood products discussed: No .     Postoperative Care    Pain management: IV analgesics.   PONV prophylaxis: Ondansetron (or other 5HT-3), Dexamethasone or Solumedrol     Comments:                Ellen Cortés MD

## 2022-02-02 ENCOUNTER — ANESTHESIA (OUTPATIENT)
Dept: SURGERY | Facility: CLINIC | Age: 42
End: 2022-02-02
Payer: COMMERCIAL

## 2022-02-02 ENCOUNTER — HOSPITAL ENCOUNTER (OUTPATIENT)
Dept: MRI IMAGING | Facility: CLINIC | Age: 42
End: 2022-02-02
Attending: ORTHOPAEDIC SURGERY | Admitting: ORTHOPAEDIC SURGERY
Payer: COMMERCIAL

## 2022-02-02 ENCOUNTER — HOSPITAL ENCOUNTER (OUTPATIENT)
Facility: CLINIC | Age: 42
Discharge: HOME | End: 2022-02-02
Attending: ORTHOPAEDIC SURGERY | Admitting: ORTHOPAEDIC SURGERY
Payer: COMMERCIAL

## 2022-02-02 VITALS
DIASTOLIC BLOOD PRESSURE: 93 MMHG | BODY MASS INDEX: 43.83 KG/M2 | OXYGEN SATURATION: 96 % | HEART RATE: 100 BPM | RESPIRATION RATE: 16 BRPM | SYSTOLIC BLOOD PRESSURE: 146 MMHG | HEIGHT: 63 IN | WEIGHT: 247.36 LBS | TEMPERATURE: 98.7 F

## 2022-02-02 DIAGNOSIS — M87.9 OSTEONECROSIS (H): ICD-10-CM

## 2022-02-02 LAB — GLUCOSE BLDC GLUCOMTR-MCNC: 90 MG/DL (ref 70–99)

## 2022-02-02 PROCEDURE — 250N000009 HC RX 250: Performed by: STUDENT IN AN ORGANIZED HEALTH CARE EDUCATION/TRAINING PROGRAM

## 2022-02-02 PROCEDURE — 73721 MRI JNT OF LWR EXTRE W/O DYE: CPT | Mod: 26 | Performed by: RADIOLOGY

## 2022-02-02 PROCEDURE — 250N000011 HC RX IP 250 OP 636: Performed by: STUDENT IN AN ORGANIZED HEALTH CARE EDUCATION/TRAINING PROGRAM

## 2022-02-02 PROCEDURE — 710N000010 HC RECOVERY PHASE 1, LEVEL 2, PER MIN

## 2022-02-02 PROCEDURE — 73721 MRI JNT OF LWR EXTRE W/O DYE: CPT | Mod: 50,XS

## 2022-02-02 PROCEDURE — 82962 GLUCOSE BLOOD TEST: CPT

## 2022-02-02 PROCEDURE — 370N000017 HC ANESTHESIA TECHNICAL FEE, PER MIN

## 2022-02-02 PROCEDURE — 999N000141 HC STATISTIC PRE-PROCEDURE NURSING ASSESSMENT

## 2022-02-02 PROCEDURE — 258N000003 HC RX IP 258 OP 636: Performed by: STUDENT IN AN ORGANIZED HEALTH CARE EDUCATION/TRAINING PROGRAM

## 2022-02-02 PROCEDURE — 710N000012 HC RECOVERY PHASE 2, PER MINUTE

## 2022-02-02 PROCEDURE — 73721 MRI JNT OF LWR EXTRE W/O DYE: CPT | Mod: RT

## 2022-02-02 RX ORDER — DEXAMETHASONE SODIUM PHOSPHATE 4 MG/ML
INJECTION, SOLUTION INTRA-ARTICULAR; INTRALESIONAL; INTRAMUSCULAR; INTRAVENOUS; SOFT TISSUE PRN
Status: DISCONTINUED | OUTPATIENT
Start: 2022-02-02 | End: 2022-02-02

## 2022-02-02 RX ORDER — ONDANSETRON 2 MG/ML
4 INJECTION INTRAMUSCULAR; INTRAVENOUS EVERY 30 MIN PRN
Status: CANCELLED | OUTPATIENT
Start: 2022-02-02

## 2022-02-02 RX ORDER — METOPROLOL TARTRATE 1 MG/ML
1-2 INJECTION, SOLUTION INTRAVENOUS EVERY 5 MIN PRN
Status: CANCELLED | OUTPATIENT
Start: 2022-02-02

## 2022-02-02 RX ORDER — ACETAMINOPHEN 325 MG/1
975 TABLET ORAL ONCE
Status: CANCELLED | OUTPATIENT
Start: 2022-02-02 | End: 2022-02-02

## 2022-02-02 RX ORDER — FENTANYL CITRATE 50 UG/ML
25 INJECTION, SOLUTION INTRAMUSCULAR; INTRAVENOUS EVERY 5 MIN PRN
Status: DISCONTINUED | OUTPATIENT
Start: 2022-02-02 | End: 2022-02-02 | Stop reason: HOSPADM

## 2022-02-02 RX ORDER — SODIUM CHLORIDE, SODIUM LACTATE, POTASSIUM CHLORIDE, CALCIUM CHLORIDE 600; 310; 30; 20 MG/100ML; MG/100ML; MG/100ML; MG/100ML
INJECTION, SOLUTION INTRAVENOUS CONTINUOUS PRN
Status: DISCONTINUED | OUTPATIENT
Start: 2022-02-02 | End: 2022-02-02

## 2022-02-02 RX ORDER — MEPERIDINE HYDROCHLORIDE 25 MG/ML
12.5 INJECTION INTRAMUSCULAR; INTRAVENOUS; SUBCUTANEOUS
Status: CANCELLED | OUTPATIENT
Start: 2022-02-02

## 2022-02-02 RX ORDER — SODIUM CHLORIDE, SODIUM LACTATE, POTASSIUM CHLORIDE, CALCIUM CHLORIDE 600; 310; 30; 20 MG/100ML; MG/100ML; MG/100ML; MG/100ML
INJECTION, SOLUTION INTRAVENOUS CONTINUOUS
Status: DISCONTINUED | OUTPATIENT
Start: 2022-02-02 | End: 2022-02-02 | Stop reason: HOSPADM

## 2022-02-02 RX ORDER — FENTANYL CITRATE 50 UG/ML
25 INJECTION, SOLUTION INTRAMUSCULAR; INTRAVENOUS EVERY 5 MIN PRN
Status: CANCELLED | OUTPATIENT
Start: 2022-02-02

## 2022-02-02 RX ORDER — ONDANSETRON 4 MG/1
4 TABLET, ORALLY DISINTEGRATING ORAL EVERY 30 MIN PRN
Status: CANCELLED | OUTPATIENT
Start: 2022-02-02

## 2022-02-02 RX ORDER — LIDOCAINE 40 MG/G
CREAM TOPICAL
Status: DISCONTINUED | OUTPATIENT
Start: 2022-02-02 | End: 2022-02-02 | Stop reason: HOSPADM

## 2022-02-02 RX ORDER — FENTANYL CITRATE 50 UG/ML
25 INJECTION, SOLUTION INTRAMUSCULAR; INTRAVENOUS
Status: CANCELLED | OUTPATIENT
Start: 2022-02-02

## 2022-02-02 RX ORDER — SODIUM CHLORIDE, SODIUM LACTATE, POTASSIUM CHLORIDE, CALCIUM CHLORIDE 600; 310; 30; 20 MG/100ML; MG/100ML; MG/100ML; MG/100ML
INJECTION, SOLUTION INTRAVENOUS CONTINUOUS
Status: CANCELLED | OUTPATIENT
Start: 2022-02-02

## 2022-02-02 RX ORDER — PROPOFOL 10 MG/ML
INJECTION, EMULSION INTRAVENOUS CONTINUOUS PRN
Status: DISCONTINUED | OUTPATIENT
Start: 2022-02-02 | End: 2022-02-02

## 2022-02-02 RX ORDER — EPHEDRINE SULFATE 50 MG/ML
INJECTION, SOLUTION INTRAMUSCULAR; INTRAVENOUS; SUBCUTANEOUS PRN
Status: DISCONTINUED | OUTPATIENT
Start: 2022-02-02 | End: 2022-02-02

## 2022-02-02 RX ORDER — LIDOCAINE HYDROCHLORIDE 20 MG/ML
INJECTION, SOLUTION INFILTRATION; PERINEURAL PRN
Status: DISCONTINUED | OUTPATIENT
Start: 2022-02-02 | End: 2022-02-02

## 2022-02-02 RX ORDER — ONDANSETRON 2 MG/ML
4 INJECTION INTRAMUSCULAR; INTRAVENOUS EVERY 30 MIN PRN
Status: DISCONTINUED | OUTPATIENT
Start: 2022-02-02 | End: 2022-02-02 | Stop reason: HOSPADM

## 2022-02-02 RX ORDER — ONDANSETRON 4 MG/1
4 TABLET, ORALLY DISINTEGRATING ORAL EVERY 30 MIN PRN
Status: DISCONTINUED | OUTPATIENT
Start: 2022-02-02 | End: 2022-02-02 | Stop reason: HOSPADM

## 2022-02-02 RX ORDER — IPRATROPIUM BROMIDE AND ALBUTEROL SULFATE 2.5; .5 MG/3ML; MG/3ML
3 SOLUTION RESPIRATORY (INHALATION) ONCE
Status: DISCONTINUED | OUTPATIENT
Start: 2022-02-02 | End: 2022-02-02 | Stop reason: HOSPADM

## 2022-02-02 RX ORDER — PROPOFOL 10 MG/ML
INJECTION, EMULSION INTRAVENOUS PRN
Status: DISCONTINUED | OUTPATIENT
Start: 2022-02-02 | End: 2022-02-02

## 2022-02-02 RX ORDER — FENTANYL CITRATE 50 UG/ML
INJECTION, SOLUTION INTRAMUSCULAR; INTRAVENOUS PRN
Status: DISCONTINUED | OUTPATIENT
Start: 2022-02-02 | End: 2022-02-02

## 2022-02-02 RX ORDER — ALBUTEROL SULFATE 0.83 MG/ML
2.5 SOLUTION RESPIRATORY (INHALATION) EVERY 4 HOURS PRN
Status: CANCELLED | OUTPATIENT
Start: 2022-02-02

## 2022-02-02 RX ADMIN — Medication 10 MG: at 14:43

## 2022-02-02 RX ADMIN — PHENYLEPHRINE HYDROCHLORIDE 100 MCG: 10 INJECTION INTRAVENOUS at 14:10

## 2022-02-02 RX ADMIN — MIDAZOLAM 2 MG: 1 INJECTION INTRAMUSCULAR; INTRAVENOUS at 13:24

## 2022-02-02 RX ADMIN — SODIUM CHLORIDE, POTASSIUM CHLORIDE, SODIUM LACTATE AND CALCIUM CHLORIDE: 600; 310; 30; 20 INJECTION, SOLUTION INTRAVENOUS at 13:24

## 2022-02-02 RX ADMIN — LIDOCAINE HYDROCHLORIDE 100 MG: 20 INJECTION, SOLUTION INFILTRATION; PERINEURAL at 13:27

## 2022-02-02 RX ADMIN — PROPOFOL 150 MG: 10 INJECTION, EMULSION INTRAVENOUS at 15:28

## 2022-02-02 RX ADMIN — PHENYLEPHRINE HYDROCHLORIDE 100 MCG: 10 INJECTION INTRAVENOUS at 14:25

## 2022-02-02 RX ADMIN — PROPOFOL 150 MCG/KG/MIN: 10 INJECTION, EMULSION INTRAVENOUS at 13:29

## 2022-02-02 RX ADMIN — Medication 10 MG: at 14:31

## 2022-02-02 RX ADMIN — PHENYLEPHRINE HYDROCHLORIDE 100 MCG: 10 INJECTION INTRAVENOUS at 14:31

## 2022-02-02 RX ADMIN — PROPOFOL 120 MG: 10 INJECTION, EMULSION INTRAVENOUS at 13:27

## 2022-02-02 RX ADMIN — DEXAMETHASONE SODIUM PHOSPHATE 6 MG: 4 INJECTION, SOLUTION INTRA-ARTICULAR; INTRALESIONAL; INTRAMUSCULAR; INTRAVENOUS; SOFT TISSUE at 13:28

## 2022-02-02 RX ADMIN — FENTANYL CITRATE 100 MCG: 50 INJECTION, SOLUTION INTRAMUSCULAR; INTRAVENOUS at 13:24

## 2022-02-02 RX ADMIN — MIDAZOLAM 2 MG: 1 INJECTION INTRAMUSCULAR; INTRAVENOUS at 12:44

## 2022-02-02 RX ADMIN — ROCURONIUM BROMIDE 50 MG: 50 INJECTION, SOLUTION INTRAVENOUS at 13:27

## 2022-02-02 RX ADMIN — PHENYLEPHRINE HYDROCHLORIDE 100 MCG: 10 INJECTION INTRAVENOUS at 13:59

## 2022-02-02 RX ADMIN — SUGAMMADEX 200 MG: 100 INJECTION, SOLUTION INTRAVENOUS at 15:36

## 2022-02-02 RX ADMIN — PHENYLEPHRINE HYDROCHLORIDE 100 MCG: 10 INJECTION INTRAVENOUS at 14:43

## 2022-02-02 ASSESSMENT — MIFFLIN-ST. JEOR: SCORE: 1761.13

## 2022-02-02 NOTE — DISCHARGE INSTRUCTIONS
[facility]  Same-Day Surgery   Adult Discharge Orders & Instructions     For 24 hours after surgery    1. Get plenty of rest.  A responsible adult must stay with you for at least 24 hours after you leave the hospital.   2. Do not drive or use heavy equipment.  If you have weakness or tingling, don't drive or use heavy equipment until this feeling goes away.  3. Do not drink alcohol.  4. Avoid strenuous or risky activities.  Ask for help when climbing stairs.   5. You may feel lightheaded.  IF so, sit for a few minutes before standing.  Have someone help you get up.   6. If you have nausea (feel sick to your stomach): Drink only clear liquids such as apple juice, ginger ale, broth or 7-Up.  Rest may also help.  Be sure to drink enough fluids.  Move to a regular diet as you feel able.  7. You may have a slight fever. Call the doctor if your fever is over 100 F (37.7 C) (taken under the tongue) or lasts longer than 24 hours.  8. You may have a dry mouth, a sore throat, muscle aches or trouble sleeping.  These should go away after 24 hours.  9. Do not make important or legal decisions.   Call your doctor for any of the followin.  Signs of infection (fever, growing tenderness at the surgery site, a large amount of drainage or bleeding, severe pain, foul-smelling drainage, redness, swelling).    2. It has been over 8 to 10 hours since surgery and you are still not able to urinate (pass water).    3.  Headache for over 24 hours.    To contact doctor dedrick ramesh, [phone] [OFFICE] or:        [phone] and ask for the resident on call for   orthopedics (answered 24 hours a day)      Emergency Department:    [facility]: [phone]

## 2022-02-02 NOTE — ANESTHESIA PROCEDURE NOTES
Airway       Patient location during procedure: OR       Procedure Start/Stop Times: 2/2/2022 1:30 PM  Staff -        Anesthesiologist:  Ed Cancino MD       Performed By: anesthesiologist  Consent for Airway        Urgency: elective  Indications and Patient Condition       Indications for airway management: nataly-procedural       Induction type:intravenous       Mask difficulty assessment: 1 - vent by mask    Final Airway Details       Final airway type: endotracheal airway       Successful airway: ETT - single  Endotracheal Airway Details        ETT size (mm): 7.0       Cuffed: yes       Successful intubation technique: direct laryngoscopy       DL Blade Type: MAC 3       Grade View of Cords: 2       Adjucts: stylet       Position: Right       Measured from: lips       Secured at (cm): 22    Post intubation assessment        Placement verified by: capnometry and chest rise        Number of attempts at approach: 1       Number of other approaches attempted: 0       Secured with: cloth tape       Ease of procedure: easy       Dentition: Lips/oral mucosa injury (left upper lip laceration)

## 2022-02-02 NOTE — ANESTHESIA POSTPROCEDURE EVALUATION
Patient: Krystle Hernández    Procedure: Procedure(s):  ANESTHESIA OUT OF OR MAGNETIC RESONANCE IMAGING OF RIGHT HIP  AND LEFT HIP WITHOUT CONTRAST,RIGHT KNEE WITHOUT CONTRAST@1200       Diagnosis:Osteonecrosis (H) [M87.9]  Diagnosis Additional Information: No value filed.    Anesthesia Type:  MAC    Note:  Disposition: Outpatient   Postop Pain Control: Uneventful            Sign Out: Well controlled pain   PONV: No   Neuro/Psych: Uneventful            Sign Out: Acceptable/Baseline neuro status   Airway/Respiratory: Uneventful            Sign Out: AIRWAY IN SITU/Resp. Support   CV/Hemodynamics: Uneventful            Sign Out: Acceptable CV status; No obvious hypovolemia; No obvious fluid overload   Other NRE: NONE   DID A NON-ROUTINE EVENT OCCUR? No           Last vitals:  Vitals Value Taken Time   /68 02/02/22 1620   Temp 37.3  C (99.1  F) 02/02/22 1539   Pulse 103 02/02/22 1621   Resp 16 02/02/22 1600   SpO2 93 % 02/02/22 1621   Vitals shown include unvalidated device data.    Electronically Signed By: MATILDA VALDEZ MD  February 2, 2022  4:25 PM

## 2022-02-02 NOTE — ANESTHESIA CARE TRANSFER NOTE
Patient: Krystle Hernández    Procedure: Procedure(s):  ANESTHESIA OUT OF OR MAGNETIC RESONANCE IMAGING OF RIGHT HIP  AND LEFT HIP WITHOUT CONTRAST,RIGHT KNEE WITHOUT CONTRAST@1200       Diagnosis: Osteonecrosis (H) [M87.9]  Diagnosis Additional Information: No value filed.    Anesthesia Type:   MAC     Note:    Oropharynx: endotracheal tube in place  Level of Consciousness: iatrogenic sedation      Independent Airway: airway patency not satisfactory and stable  Dentition: dentition unchanged  Vital Signs Stable: post-procedure vital signs reviewed and stable  Report to RN Given: handoff report given  Patient transferred to: PACU    Handoff Report: Identifed the Patient, Identified the Reponsible Provider, Reviewed the pertinent medical history, Discussed the surgical course, Reviewed Intra-OP anesthesia mangement and issues during anesthesia, Set expectations for post-procedure period and Allowed opportunity for questions and acknowledgement of understanding      Vitals:  Vitals Value Taken Time   /103 02/02/22 1600   Temp 37.3  C (99.1  F) 02/02/22 1539   Pulse 106 02/02/22 1603   Resp 16 02/02/22 1600   SpO2 96 % 02/02/22 1603   Vitals shown include unvalidated device data.    Electronically Signed By: Ellen Cortés MD  February 2, 2022  4:04 PM

## 2022-02-07 ENCOUNTER — VIRTUAL VISIT (OUTPATIENT)
Dept: ORTHOPEDICS | Facility: CLINIC | Age: 42
End: 2022-02-07
Payer: COMMERCIAL

## 2022-02-07 DIAGNOSIS — M87.9 OSTEONECROSIS (H): Primary | ICD-10-CM

## 2022-02-07 PROCEDURE — 99214 OFFICE O/P EST MOD 30 MIN: CPT | Mod: 95 | Performed by: ORTHOPAEDIC SURGERY

## 2022-02-07 NOTE — PROGRESS NOTES
[facility]  Orthopaedic Surgery, Joint Replacement Consultation  by Mario Wu M.D.    Krystle Hernández MRN# [id number]   Age: 39 year old YOB: 1980     Requesting physician: Barry Conner  No Ref-Primary, Physician       Background history:  1.  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  Asthma      This patient is seen on virtual video visit today.  There were technical problems and she was not able to hear properly with the audio and therefore it was converted to a telephone call at patient request.    She is seen today to review results of her MRI examination which fortunately shows no evidence of osteonecrosis involving either hip femoral head area.  She does have a large area of the right distal femur which is known about.  There is a small area within the intercondylar notch distal femur which is suggestive of a early subchondral fracture and some collapse.  Is rather subtle however but probably is responsible for sit for her symptoms.  Another infarct in the posterior lateral aspect of the lateral femoral condyle does not appear to have resulted in collapse.  There is no symptoms of the left knee joint.      Laboratory studies indicate no evidence of infectious process.  She does have calcium pyrophosphate deposition crystals indicative of degenerative disease and pseudogout.    Impression and plan:  1.  Osteonecrosis of right distal femur, steroid related, with possible subchondral fracture leading to degenerative arthrosis.  This is relatively mild at this juncture and I recommended activity modification is the best means of addressing her symptoms.  If this is not sufficient, then an intra-articular steroid injection into the right knee joint could be considered.  If she wishes to pursue this she will be in contact with our office for another appointment.  2.  We also had a discussion about her candidacy for likely total knee  arthroplasty or an arthroplasty procedure in her future.  I have counseled her that would be very very important to reduce her weight as her current BMI is 43.82 kg/m .  I advised her to focus on this as an investment in her future.  3.  Discontinue all forms of alcohol usage.      MD Ileana Storey Family Professor  Oncology and Adult Reconstructive Surgery  Dept Orthopaedic Surgery, [facility]  [phone] office, [phone] pager  [email or web address]    Virtual-Visit Details    Type of service:  Video/telephone Visit  Video/telephone total duration (including visit time, pre and post visit work time as documented above on the same day of service): 20    Visit start time: 1515  Visit end time:3:45 PM  Originating Location (pt. Location): Home  Distant Location (provider location):  [facility] ORTHOPEDIC [facility]   Platform used for Virtual Visit: [employer]

## 2022-02-07 NOTE — LETTER
2/7/2022         RE: Krystle Hernández  [address]  [place] [address]        Dear Colleague,    Thank you for referring your patient, Krystle Hernández, to the [facility] [place]. Please see a copy of my visit note below.            [facility]  Orthopaedic Surgery, Joint Replacement Consultation  by Mario Wu M.D.    Krystle Hernández MRN# [id number]   Age: 39 year old YOB: 1980     Requesting physician: Barry Conner  No Ref-Primary, Physician       Background history:  1.  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  Asthma      This patient is seen on virtual video visit today.  There were technical problems and she was not able to hear properly with the audio and therefore it was converted to a telephone call at patient request.    She is seen today to review results of her MRI examination which fortunately shows no evidence of osteonecrosis involving either hip femoral head area.  She does have a large area of the right distal femur which is known about.  There is a small area within the intercondylar notch distal femur which is suggestive of a early subchondral fracture and some collapse.  Is rather subtle however but probably is responsible for sit for her symptoms.  Another infarct in the posterior lateral aspect of the lateral femoral condyle does not appear to have resulted in collapse.  There is no symptoms of the left knee joint.      Laboratory studies indicate no evidence of infectious process.  She does have calcium pyrophosphate deposition crystals indicative of degenerative disease and pseudogout.    Impression and plan:  1.  Osteonecrosis of right distal femur, steroid related, with possible subchondral fracture leading to degenerative arthrosis.  This is relatively mild at this juncture and I recommended activity modification is the best means of addressing her symptoms.  If this is not sufficient,  then an intra-articular steroid injection into the right knee joint could be considered.  If she wishes to pursue this she will be in contact with our office for another appointment.  2.  We also had a discussion about her candidacy for likely total knee arthroplasty or an arthroplasty procedure in her future.  I have counseled her that would be very very important to reduce her weight as her current BMI is 43.82 kg/m .  I advised her to focus on this as an investment in her future.  3.  Discontinue all forms of alcohol usage.      Mario Wu MD [facility] Family Professor  Oncology and Adult Reconstructive Surgery  Dept Orthopaedic Surgery, [facility]  [phone] office, [phone] pager  [email or web address]    Virtual-Visit Details    Type of service:  Video/telephone Visit  Video/telephone total duration (including visit time, pre and post visit work time as documented above on the same day of service): 20    Visit start time: 1515  Visit end time:3:45 PM  Originating Location (pt. Location): Home  Distant Location (provider location):  [facility] ORTHOPEDIC [facility]   Platform used for Virtual Visit: [employer]

## 2022-05-21 ENCOUNTER — HEALTH MAINTENANCE LETTER (OUTPATIENT)
Age: 42
End: 2022-05-21

## 2022-09-18 ENCOUNTER — HEALTH MAINTENANCE LETTER (OUTPATIENT)
Age: 42
End: 2022-09-18

## 2023-03-21 ENCOUNTER — TRANSFERRED RECORDS (OUTPATIENT)
Dept: MULTI SPECIALTY CLINIC | Facility: CLINIC | Age: 43
End: 2023-03-21

## 2023-03-21 LAB
CREATININE (EXTERNAL): 0.7 MG/DL (ref 0.4–1)
GFR ESTIMATED (EXTERNAL): 111 ML/MIN/1.73M2
GLUCOSE (EXTERNAL): 94 MG/DL (ref 70–99)
POTASSIUM (EXTERNAL): 4.2 MEQ/L (ref 3.4–5.1)

## 2023-04-14 RX ORDER — ROSUVASTATIN CALCIUM 20 MG/1
20 TABLET, COATED ORAL DAILY
COMMUNITY

## 2023-04-14 RX ORDER — LIRAGLUTIDE 6 MG/ML
1.2 INJECTION SUBCUTANEOUS DAILY
COMMUNITY
End: 2024-01-17

## 2023-04-18 ENCOUNTER — ANESTHESIA EVENT (OUTPATIENT)
Dept: SURGERY | Facility: CLINIC | Age: 43
End: 2023-04-18
Payer: COMMERCIAL

## 2023-04-18 ENCOUNTER — ANESTHESIA (OUTPATIENT)
Dept: SURGERY | Facility: CLINIC | Age: 43
End: 2023-04-18
Payer: COMMERCIAL

## 2023-04-18 ENCOUNTER — APPOINTMENT (OUTPATIENT)
Dept: MRI IMAGING | Facility: CLINIC | Age: 43
End: 2023-04-18
Attending: ORTHOPAEDIC SURGERY
Payer: COMMERCIAL

## 2023-04-18 ENCOUNTER — HOSPITAL ENCOUNTER (OUTPATIENT)
Dept: MRI IMAGING | Facility: CLINIC | Age: 43
Discharge: HOME | End: 2023-04-18
Attending: ORTHOPAEDIC SURGERY | Admitting: ANESTHESIOLOGY
Payer: COMMERCIAL

## 2023-04-18 ENCOUNTER — HOSPITAL ENCOUNTER (OUTPATIENT)
Facility: CLINIC | Age: 43
Discharge: HOME | End: 2023-04-18
Attending: ANESTHESIOLOGY | Admitting: ANESTHESIOLOGY
Payer: COMMERCIAL

## 2023-04-18 VITALS
TEMPERATURE: 98.8 F | RESPIRATION RATE: 16 BRPM | WEIGHT: 231.48 LBS | SYSTOLIC BLOOD PRESSURE: 128 MMHG | BODY MASS INDEX: 41.02 KG/M2 | OXYGEN SATURATION: 96 % | HEART RATE: 101 BPM | HEIGHT: 63 IN | DIASTOLIC BLOOD PRESSURE: 80 MMHG

## 2023-04-18 DIAGNOSIS — M25.551 PAIN IN RIGHT HIP: ICD-10-CM

## 2023-04-18 DIAGNOSIS — M25.552 PAIN IN LEFT HIP: ICD-10-CM

## 2023-04-18 LAB
GLUCOSE BLDC GLUCOMTR-MCNC: 105 MG/DL (ref 70–99)
GLUCOSE BLDC GLUCOMTR-MCNC: 96 MG/DL (ref 70–99)

## 2023-04-18 PROCEDURE — 258N000003 HC RX IP 258 OP 636: Performed by: NURSE ANESTHETIST, CERTIFIED REGISTERED

## 2023-04-18 PROCEDURE — 73721 MRI JNT OF LWR EXTRE W/O DYE: CPT | Mod: 26 | Performed by: RADIOLOGY

## 2023-04-18 PROCEDURE — 250N000011 HC RX IP 250 OP 636: Performed by: ANESTHESIOLOGY

## 2023-04-18 PROCEDURE — 82962 GLUCOSE BLOOD TEST: CPT

## 2023-04-18 PROCEDURE — 250N000009 HC RX 250: Performed by: NURSE ANESTHETIST, CERTIFIED REGISTERED

## 2023-04-18 PROCEDURE — 710N000012 HC RECOVERY PHASE 2, PER MINUTE

## 2023-04-18 PROCEDURE — 250N000011 HC RX IP 250 OP 636: Performed by: STUDENT IN AN ORGANIZED HEALTH CARE EDUCATION/TRAINING PROGRAM

## 2023-04-18 PROCEDURE — 710N000009 HC RECOVERY PHASE 1, LEVEL 1, PER MIN

## 2023-04-18 PROCEDURE — 250N000009 HC RX 250: Performed by: STUDENT IN AN ORGANIZED HEALTH CARE EDUCATION/TRAINING PROGRAM

## 2023-04-18 PROCEDURE — 73721 MRI JNT OF LWR EXTRE W/O DYE: CPT | Mod: 50

## 2023-04-18 PROCEDURE — 370N000017 HC ANESTHESIA TECHNICAL FEE, PER MIN

## 2023-04-18 PROCEDURE — 250N000025 HC SEVOFLURANE, PER MIN

## 2023-04-18 PROCEDURE — 999N000141 HC STATISTIC PRE-PROCEDURE NURSING ASSESSMENT

## 2023-04-18 PROCEDURE — 250N000013 HC RX MED GY IP 250 OP 250 PS 637: Performed by: ANESTHESIOLOGY

## 2023-04-18 PROCEDURE — 250N000009 HC RX 250: Performed by: ANESTHESIOLOGY

## 2023-04-18 PROCEDURE — 250N000011 HC RX IP 250 OP 636: Performed by: NURSE ANESTHETIST, CERTIFIED REGISTERED

## 2023-04-18 RX ORDER — SODIUM CHLORIDE, SODIUM LACTATE, POTASSIUM CHLORIDE, CALCIUM CHLORIDE 600; 310; 30; 20 MG/100ML; MG/100ML; MG/100ML; MG/100ML
INJECTION, SOLUTION INTRAVENOUS CONTINUOUS PRN
Status: DISCONTINUED | OUTPATIENT
Start: 2023-04-18 | End: 2023-04-18

## 2023-04-18 RX ORDER — DEXAMETHASONE SODIUM PHOSPHATE 4 MG/ML
INJECTION, SOLUTION INTRA-ARTICULAR; INTRALESIONAL; INTRAMUSCULAR; INTRAVENOUS; SOFT TISSUE PRN
Status: DISCONTINUED | OUTPATIENT
Start: 2023-04-18 | End: 2023-04-18

## 2023-04-18 RX ORDER — FENTANYL CITRATE 50 UG/ML
INJECTION, SOLUTION INTRAMUSCULAR; INTRAVENOUS PRN
Status: DISCONTINUED | OUTPATIENT
Start: 2023-04-18 | End: 2023-04-18

## 2023-04-18 RX ORDER — ONDANSETRON 4 MG/1
4 TABLET, ORALLY DISINTEGRATING ORAL EVERY 30 MIN PRN
Status: CANCELLED | OUTPATIENT
Start: 2023-04-18

## 2023-04-18 RX ORDER — LIDOCAINE 40 MG/G
CREAM TOPICAL
Status: DISCONTINUED | OUTPATIENT
Start: 2023-04-18 | End: 2023-04-18 | Stop reason: HOSPADM

## 2023-04-18 RX ORDER — ONDANSETRON 2 MG/ML
4 INJECTION INTRAMUSCULAR; INTRAVENOUS EVERY 30 MIN PRN
Status: CANCELLED | OUTPATIENT
Start: 2023-04-18

## 2023-04-18 RX ORDER — FENTANYL CITRATE 50 UG/ML
25 INJECTION, SOLUTION INTRAMUSCULAR; INTRAVENOUS EVERY 5 MIN PRN
Status: CANCELLED | OUTPATIENT
Start: 2023-04-18

## 2023-04-18 RX ORDER — PROPOFOL 10 MG/ML
INJECTION, EMULSION INTRAVENOUS CONTINUOUS PRN
Status: DISCONTINUED | OUTPATIENT
Start: 2023-04-18 | End: 2023-04-18

## 2023-04-18 RX ORDER — FENTANYL CITRATE 50 UG/ML
50 INJECTION, SOLUTION INTRAMUSCULAR; INTRAVENOUS EVERY 5 MIN PRN
Status: CANCELLED | OUTPATIENT
Start: 2023-04-18

## 2023-04-18 RX ORDER — SODIUM CHLORIDE, SODIUM LACTATE, POTASSIUM CHLORIDE, CALCIUM CHLORIDE 600; 310; 30; 20 MG/100ML; MG/100ML; MG/100ML; MG/100ML
INJECTION, SOLUTION INTRAVENOUS CONTINUOUS
Status: CANCELLED | OUTPATIENT
Start: 2023-04-18

## 2023-04-18 RX ORDER — PROPOFOL 10 MG/ML
INJECTION, EMULSION INTRAVENOUS PRN
Status: DISCONTINUED | OUTPATIENT
Start: 2023-04-18 | End: 2023-04-18

## 2023-04-18 RX ORDER — SCOLOPAMINE TRANSDERMAL SYSTEM 1 MG/1
1 PATCH, EXTENDED RELEASE TRANSDERMAL
Status: DISCONTINUED | OUTPATIENT
Start: 2023-04-18 | End: 2023-04-18 | Stop reason: HOSPADM

## 2023-04-18 RX ORDER — LORAZEPAM 1 MG/1
1 TABLET ORAL ONCE
Status: COMPLETED | OUTPATIENT
Start: 2023-04-18 | End: 2023-04-18

## 2023-04-18 RX ORDER — SODIUM CHLORIDE, SODIUM LACTATE, POTASSIUM CHLORIDE, CALCIUM CHLORIDE 600; 310; 30; 20 MG/100ML; MG/100ML; MG/100ML; MG/100ML
INJECTION, SOLUTION INTRAVENOUS CONTINUOUS
Status: DISCONTINUED | OUTPATIENT
Start: 2023-04-18 | End: 2023-04-18 | Stop reason: HOSPADM

## 2023-04-18 RX ORDER — LIDOCAINE HYDROCHLORIDE 20 MG/ML
INJECTION, SOLUTION INFILTRATION; PERINEURAL PRN
Status: DISCONTINUED | OUTPATIENT
Start: 2023-04-18 | End: 2023-04-18

## 2023-04-18 RX ADMIN — PROPOFOL 150 MCG/KG/MIN: 10 INJECTION, EMULSION INTRAVENOUS at 18:46

## 2023-04-18 RX ADMIN — FENTANYL CITRATE 100 MCG: 50 INJECTION, SOLUTION INTRAMUSCULAR; INTRAVENOUS at 18:45

## 2023-04-18 RX ADMIN — Medication 20 MG: at 19:10

## 2023-04-18 RX ADMIN — SCOPALAMINE 1 PATCH: 1 PATCH, EXTENDED RELEASE TRANSDERMAL at 17:12

## 2023-04-18 RX ADMIN — PHENYLEPHRINE HYDROCHLORIDE 200 MCG: 10 INJECTION INTRAVENOUS at 19:24

## 2023-04-18 RX ADMIN — LORAZEPAM 1 MG: 1 TABLET ORAL at 13:44

## 2023-04-18 RX ADMIN — Medication 30 MG: at 19:30

## 2023-04-18 RX ADMIN — LIDOCAINE HYDROCHLORIDE 60 MG: 20 INJECTION, SOLUTION INFILTRATION; PERINEURAL at 18:46

## 2023-04-18 RX ADMIN — PHENYLEPHRINE HYDROCHLORIDE 100 MCG: 10 INJECTION INTRAVENOUS at 20:20

## 2023-04-18 RX ADMIN — MIDAZOLAM 2 MG: 1 INJECTION INTRAMUSCULAR; INTRAVENOUS at 18:32

## 2023-04-18 RX ADMIN — Medication 50 MG: at 18:47

## 2023-04-18 RX ADMIN — PHENYLEPHRINE HYDROCHLORIDE 200 MCG: 10 INJECTION INTRAVENOUS at 19:15

## 2023-04-18 RX ADMIN — SUGAMMADEX 200 MG: 100 INJECTION, SOLUTION INTRAVENOUS at 20:32

## 2023-04-18 RX ADMIN — PHENYLEPHRINE HYDROCHLORIDE 200 MCG: 10 INJECTION INTRAVENOUS at 19:10

## 2023-04-18 RX ADMIN — PHENYLEPHRINE HYDROCHLORIDE 100 MCG: 10 INJECTION INTRAVENOUS at 20:05

## 2023-04-18 RX ADMIN — DEXAMETHASONE SODIUM PHOSPHATE 6 MG: 4 INJECTION, SOLUTION INTRA-ARTICULAR; INTRALESIONAL; INTRAMUSCULAR; INTRAVENOUS; SOFT TISSUE at 18:52

## 2023-04-18 RX ADMIN — LIDOCAINE HYDROCHLORIDE 40 MG: 20 INJECTION, SOLUTION INFILTRATION; PERINEURAL at 18:38

## 2023-04-18 RX ADMIN — PROPOFOL 250 MG: 10 INJECTION, EMULSION INTRAVENOUS at 18:46

## 2023-04-18 RX ADMIN — MIDAZOLAM 2 MG: 1 INJECTION INTRAMUSCULAR; INTRAVENOUS at 18:45

## 2023-04-18 RX ADMIN — SODIUM CHLORIDE, POTASSIUM CHLORIDE, SODIUM LACTATE AND CALCIUM CHLORIDE: 600; 310; 30; 20 INJECTION, SOLUTION INTRAVENOUS at 18:32

## 2023-04-18 RX ADMIN — SODIUM CHLORIDE, POTASSIUM CHLORIDE, SODIUM LACTATE AND CALCIUM CHLORIDE: 600; 310; 30; 20 INJECTION, SOLUTION INTRAVENOUS at 18:35

## 2023-04-18 ASSESSMENT — ACTIVITIES OF DAILY LIVING (ADL)
ADLS_ACUITY_SCORE: 35

## 2023-04-18 NOTE — PROGRESS NOTES
Patient experiencing increased anxiety. Notified Dr. Hawkins, ordered for 1mg PO home dose of ativan.

## 2023-04-18 NOTE — ANESTHESIA PREPROCEDURE EVALUATION
Anesthesia Pre-Procedure Evaluation    Patient: Krystle Hernández   MRN: [id number] : 1980        Procedure : Procedure(s):  Anesthesia out of OR MAGNETIC RESONANCE IMAGING OF BILATERAL HIPS WITHOUT CONTRAST@1400          Past Medical History:   Diagnosis Date     Anxiety      Diabetes (H)      PONV (postoperative nausea and vomiting)      Sleep apnea       Past Surgical History:   Procedure Laterality Date     ANESTHESIA OUT OF OR MRI N/A 2022    Procedure: ANESTHESIA OUT OF OR MAGNETIC RESONANCE IMAGING OF RIGHT HIP  AND LEFT HIP WITHOUT CONTRAST,RIGHT KNEE WITHOUT CONTRAST@1200;  Surgeon: GENERIC ANESTHESIA PROVIDER;  Location: [facility] OR     ORTHOPEDIC SURGERY        Allergies   Allergen Reactions     Codeine Anaphylaxis, Difficulty breathing, Hives, Itching, Other (See Comments), Rash and Shortness Of Breath     Comment: Throt swelling, Description:        Imidazole Antifungals Anaphylaxis     Metronidazole Anaphylaxis, Difficulty breathing, Hives, Itching, Other (See Comments), Rash, Shortness Of Breath and Swelling     Comment: Throat swelling, Description:   Comment: Throat swelling, Description:         Social History     Tobacco Use     Smoking status: Some Days     Smokeless tobacco: Never   Vaping Use     Vaping status: Not on file   Substance Use Topics     Alcohol use: Yes     Comment: social       Wt Readings from Last 1 Encounters:   23 105 kg (231 lb 7.7 oz)        Anesthesia Evaluation   Pt has had prior anesthetic. Type: General.    History of anesthetic complications  - PONV.      ROS/MED HX  ENT/Pulmonary:     (+) sleep apnea, doesn't use CPAP, Intermittent, asthma     Neurologic:  - neg neurologic ROS     Cardiovascular:       METS/Exercise Tolerance: 3 - Able to walk 1-2 blocks without stopping    Hematologic:  - neg hematologic  ROS     Musculoskeletal: Comment: Osteonecrosis hips and knees      GI/Hepatic:     (+) GERD, Asymptomatic on medication, hiatal hernia,      Renal/Genitourinary:  - neg Renal ROS     Endo:       Psychiatric/Substance Use:     (+) psychiatric history anxiety and depression     Infectious Disease:  - neg infectious disease ROS     Malignancy:  - neg malignancy ROS     Other:            Physical Exam    Airway        Mallampati: II   TM distance: > 3 FB   Neck ROM: full   Mouth opening: > 3 cm    Respiratory Devices and Support         Dental       (+) Minor Abnormalities - some fillings, tiny chips      Cardiovascular   cardiovascular exam normal          Pulmonary   pulmonary exam normal                OUTSIDE LABS:  CBC: No results found for: WBC, HGB, HCT, PLT  BMP:   Lab Results   Component Value Date    GLC 96 04/18/2023     (H) 04/18/2023     COAGS: No results found for: PTT, INR, FIBR  POC: No results found for: BGM, HCG, HCGS  HEPATIC: No results found for: ALBUMIN, PROTTOTAL, ALT, AST, GGT, ALKPHOS, BILITOTAL, BILIDIRECT, MARIA  OTHER: No results found for: PH, LACT, A1C, CATINA, PHOS, MAG, LIPASE, AMYLASE, TSH, T4, T3, CRP, SED    Anesthesia Plan    ASA Status:  2      Anesthesia Type: General.     - Airway: ETT   Induction: Intravenous.   Maintenance: Balanced.        Consents    Anesthesia Plan(s) and associated risks, benefits, and realistic alternatives discussed. Questions answered and patient/representative(s) expressed understanding.    - Discussed:     - Discussed with:  Patient      - Extended Intubation/Ventilatory Support Discussed: Yes.      - Patient is DNR/DNI Status: No    Use of blood products discussed: No .     Postoperative Care    Pain management: Multi-modal analgesia.   PONV prophylaxis: Ondansetron (or other 5HT-3)     Comments:              PAC Discussion and Assessment    ASA Classification: 2    Anesthetic techniques and relevant risks discussed: GA  Invasive monitoring and risk discussed: No    Possibility and Risk of blood transfusion discussed: No  NPO instructions given: No solids 6 hours before surgery, stop  clear liquids 2 hours before surgery    Needs early admission to pre-op area: Saray Hawkins MD

## 2023-04-19 NOTE — DISCHARGE INSTRUCTIONS
[facility], [place]  Same-Day Surgery   Adult Discharge Orders & Instructions     For 24 hours after surgery    Get plenty of rest.  A responsible adult must stay with you for at least 24 hours after you leave the hospital.   Do not drive or use heavy equipment.  If you have weakness or tingling, don't drive or use heavy equipment until this feeling goes away.  Do not drink alcohol.  Avoid strenuous or risky activities.  Ask for help when climbing stairs.   You may feel lightheaded.  IF so, sit for a few minutes before standing.  Have someone help you get up.   If you have nausea (feel sick to your stomach): Drink only clear liquids such as apple juice, ginger ale, broth or 7-Up.  Rest may also help.  Be sure to drink enough fluids.  Move to a regular diet as you feel able.  You may have a slight fever. Call the doctor if your fever is over 100 F (37.7 C) (taken under the tongue) or lasts longer than 24 hours.  You may have a dry mouth, a sore throat, muscle aches or trouble sleeping.  These should go away after 24 hours.  Do not make important or legal decisions.   Call your doctor for any of the followin.  Signs of infection (fever, growing tenderness at the surgery site, a large amount of drainage or bleeding, severe pain, foul-smelling drainage, redness, swelling).    2. It has been over 8 to 10 hours since surgery and you are still not able to urinate (pass water).    3.  Headache for over 24 hours.    4.  Numbness, tingling or weakness the day after surgery (if you had spinal anesthesia).  To contact a doctor, call ________________________________________ or:    '   [phone] and ask for the resident on call for   ______________________________________________ (answered 24 hours a day)  '   Emergency Department:    [facility]: [phone]       (TTY for hearing impaired: [phone])    [facility]: [phone]       (TTY for hearing impaired:  [phone])

## 2023-04-19 NOTE — ANESTHESIA PROCEDURE NOTES
Airway       Patient location during procedure: OR       Procedure Start/Stop Times: 4/18/2023 6:51 PM  Staff -        CRNA: Bennett Garcia APRN CRNA       Performed By: CRNA  Consent for Airway        Urgency: elective  Indications and Patient Condition       Indications for airway management: nataly-procedural       Induction type:intravenous       Mask difficulty assessment: 2 - vent by mask + OA or adjuvant +/- NMBA    Final Airway Details       Final airway type: endotracheal airway       Successful airway: ETT - single  Endotracheal Airway Details        ETT size (mm): 7.0       Cuffed: yes       Successful intubation technique: direct laryngoscopy       DL Blade Type: MAC 3       Grade View of Cords: 2       Adjucts: stylet       Position: Right       Measured from: gums/teeth       Secured at (cm): 23       Bite block used: None    Post intubation assessment        Placement verified by: capnometry, equal breath sounds and chest rise        Number of attempts at approach: 1       Number of other approaches attempted: 0       Secured with: pink tape       Ease of procedure: easy       Dentition: Intact and Unchanged    Medication(s) Administered   Medication Administration Time: 4/18/2023 6:51 PM

## 2023-04-19 NOTE — ANESTHESIA CARE TRANSFER NOTE
Patient: Krystle CHRISTINA Edgar    Procedure: Procedure(s):  Anesthesia out of OR MAGNETIC RESONANCE IMAGING OF BILATERAL HIPS WITHOUT CONTRAST@1400       Diagnosis: Right hip pain [M25.551]  Left hip pain [M25.552]  Diagnosis Additional Information: No value filed.    Anesthesia Type:   General     Note:    Oropharynx: oropharynx clear of all foreign objects  Level of Consciousness: awake  Oxygen Supplementation: face mask    Independent Airway: airway patency satisfactory and stable  Dentition: dentition unchanged  Vital Signs Stable: post-procedure vital signs reviewed and stable  Report to RN Given: handoff report given  Patient transferred to: PACU    Handoff Report: Identifed the Patient, Identified the Reponsible Provider, Reviewed the pertinent medical history, Discussed the surgical course, Reviewed Intra-OP anesthesia mangement and issues during anesthesia, Set expectations for post-procedure period and Allowed opportunity for questions and acknowledgement of understanding      Vitals:  Vitals Value Taken Time   /78 04/18/23 2038   Temp     Pulse 112 04/18/23 2043   Resp 30 04/18/23 2043   SpO2 97 % 04/18/23 2043   Vitals shown include unvalidated device data.    Electronically Signed By: LIO Farley CRNA  April 18, 2023  8:44 PM

## 2023-04-19 NOTE — ANESTHESIA POSTPROCEDURE EVALUATION
Patient: Krystle Hernández    Procedure: Procedure(s):  Anesthesia out of OR MAGNETIC RESONANCE IMAGING OF BILATERAL HIPS WITHOUT CONTRAST@1400       Anesthesia Type:  General    Note:  Disposition: Outpatient   Postop Pain Control: Uneventful            Sign Out: Well controlled pain   PONV: No   Neuro/Psych: Uneventful            Sign Out: Acceptable/Baseline neuro status   Airway/Respiratory: Uneventful            Sign Out: Acceptable/Baseline resp. status   CV/Hemodynamics: Uneventful            Sign Out: Acceptable CV status; No obvious hypovolemia; No obvious fluid overload   Other NRE: NONE   DID A NON-ROUTINE EVENT OCCUR? No           Last vitals:  Vitals Value Taken Time   /75 04/18/23 2100   Temp 37.2  C (98.9  F) 04/18/23 2036   Pulse 101 04/18/23 2106   Resp 10 04/18/23 2106   SpO2 94 % 04/18/23 2106   Vitals shown include unvalidated device data.    Electronically Signed By: KELSEY QUIROGA MD  April 18, 2023  9:07 PM

## 2023-10-11 NOTE — PATIENT INSTRUCTIONS
[email or web address]    [facility] Bas[facility]    Remember to:    -Eat 3 meals a day (not 2, not 5) Chew your food well/SLOW down  -Eat your protein first  -Be a water drinker/Minize liquid calories (no regular pop, no juice) skim or 1% milk OK  -Sleep 7-8 hours each night. Address sleep if problematic  -Stress management is important. Address if problematic  -Move-8000 steps daily Muscle: maintain your muscle mass (strength training 2X/wk)  -Wheat, not white (bread, pasta, crackers, yonathan, bagels, tortillas, rice)  -Limit restaurant, cafeteria, take out, drive through to 2 times per week or less  -Minimize caffeine, alcohol, and night-time snacking  -Consider keeping a food diary (i.e. My Fitness Pal, Lose It, or other food tracker)  -Follow up with the dietitian      **Some lean proteins: chicken, turkey, tuna, salmon, crab, fish, shrimp, scallops, lobster, lean cuts of beef and pork, luncheon meats, veggie burgers, beans (black, lima, garbanzo, delaney, kidney, refried), chile, cottage cheese, string cheese, other cheese, eggs, tofu, peanut butter, nuts, vegan crumbles, greek yogurt      MEDICATIONS FOR WEIGHT LOSS    PHENTERMINE (Adipex): approved in 1959 for appetite suppression.  It has stimulant effects and cannot be used with Ritalin, Concerta, or other stimulants.  It is not addictive although it's chemically related to amphetamines.  Amphetamines are addictive. The most common side effects are dry mouth, increased energy and concentration, increased pulse, and constipation.  You should not take phentermine if you have glaucoma, hyperthyroidism, or uncontrolled/untreated hypertension.  $24-$30 for 90 tablets    PHENDIMETRAZINE (Bontril): Appetite suppressant/sympathomimetic.  Controlled substance.  Side effects and contraindications similar to phentermine.  $45-$60 for 3 month supply    TOPIRAMATE (Topamax): Anti-seizure medication, also used to prevent migraines.  Side effects include paresthesia, glaucoma,  altered concentration, attention difficulties, memory and speech problems, metabolic acidosis, depression, increase in body temperature and decrease sweating, kidney stones, and weight loss.  Do not take Topamax while taking Depakote as this can cause high ammonia levels.  You must have reliable birth control as Topamax can cause birth defects.  Discontinue slowly to avoid seizure.  Insurance usually covers Topiramate.    QSYMIA (Phentermine + Topamax):  See above information about phentermine and Topamax.  Most common side effects are paresthesia, dizziness, distortion of taste, insomnia, constipation, and dry mouth.  $150-$220 per month    DIETHYLPROPION (Tenuate): Sympathomimetic amine.  Appetite suppressant.  Doses 25 mg before meals or 75 mg per day.  Most common side effects are hypertension, palpitations, EKG changes, and increased seizures in epileptics.  There can be a possible adverse reaction with alcohol.  $70-$90 per 3 months    XENICAL(Orlistat) (Sergio OTC): Approved in 1999.  A fat-blocker.  It reduces absorption of fat by approximately 30%.  It has beneficial effects on lipid levels.  Side effects include diarrhea, abdominal cramping, fecal incontinence, oily spotting, and flatus with discharge.  Side effects are minimized if the patient limits their dietary fat to no more than 30% of their diet.  Patients must take a multivitamin daily to avoid vitamin D, E, A, and K deficiency.  $120 per month    CONTRAVE (Naltrexone/Bupropion): Approved in 2014.  It is a combination pill including an opioid receptor blocker and a long-standing antidepressant.  Most common side effects include nausea, constipation, headache, vomiting, dizziness, trouble sleeping, dry mouth, and diarrhea.  With all antidepressants watch for mood changes and suicide ideation.  Bupropion has been known to lower the seizure threshold in those prone to seizures.  It should not be used in a patient with a recent history of bulimia. It has  been associated with liver damage from taking higher than recommended doses.  Do not use countrave if you have taken opioid medications or opioid street drugs in the past 7-10 days, if you are currently on opioids, methadone, or if you are pregnant.  Do not use contrave if you have recently stopped using alcohol or benzodiazepines.  Taper off contrave slowly.  Dosing: titrate up to 2 tablets twice daily of the Naltrexone 8 mg/ Bupropion 90 mg tablets.  $200 for 90 tablets    SAXENDA (Liraglutide): A daily injectable (3mg daily) medication used for type 2 diabetes (Victoza). Glucagon-like peptide-1 (GLP-1) agonist. Contraindications include personal or family history of medullary thyroid cancer or MEN type 2. Acute pancreatitis has been observed in patients taking liraglutide. Liraglutide causes C-cell tumors in rats and mice. It is unknown whether liraglutide causes tumors in humans. Start at 0.6mg, increasing the dose weekly up to 3mg.     TRULICITY (Dulaglutide): A weekly injectable (4.5mg weekly) medication used for type 2 diabetes. Glucagon-like peptide-1(GLP-1) agonist. Contraindications include personal or family history of medullary thyroid cancer or MEN type 2. Acute pancreatitis has been observed in patients taking liraglutide. Dulaglutide causes C-cell tumors in rats and mice. It is unknown whether liraglutide causes tumors in humans. Start at 0.75 mg, 1.5 mg, 3.0 mg, to 4.5 mg increasing the dose monthly.      VYVANSE (Lisdexamfetamine dimesylate): a CNS stimulant used to treat ADHD. Indicated for the treatment of moderate to severe Binge Eating Disorder in Adults. Contraindicated in patients with known heart disease, structural abnormalities of the heart, serious heart arrhythmias or unexplained syncope. CNS stimulants such as vyvanse may cause manic or psychotic symptoms in patients with BPAD or pre-existing psychosis. Use with caution in patients with Raynaud's phenomenon. Most common side effects  include dry mouth, insomnia, decreased appetite, increased heart rate, jittery feeling, constipation and anxiety.     WEGOVY (Semaglutide): A weekly injectable (2.4mg weekly) medication used for type 2 diabetes (Ozempic). Glucagon-like peptide-1 (GLP-1) agonist. Contraindications include personal or family history of medullary thyroid cancer or MEN type 2. Acute pancreatitis has been observed in patients taking Semaglutide. Semaglutide causes C-cell tumors in rats and mice. It is unknown whether Semaglutide causes tumors in humans. Start at 0.25mg, increasing to 0.5, 1.0, 1.7 then 2.4 monthly.     MOUNJARO (Tirzepatide): A weekly injectable medication indicated for type 2 diabetes. Glucagon-like-peptide-1 (GLP-1) agonist and Glucose-Dependent Insulinotropic Polypeptide (GIP) agonist. Contraindications include personal or family history of medullary thyroid cancer or MEN type 2. Acute pancreatitis has been observed in patients taking Tirzepatide. Tirzepatide causes C-cell tumors in rats and mice. It is unknown whether Tirzepatide causes tumors in humans. Watch for neck mass, difficulty swallowing, persistent hoarseness, and epigastric abdominal pain. Most common side effects include nausea, diarrhea, vomiting, constipation, dyspepsia, and abdominal pain. Start at 2.5mg, increasing to 5.0, 7.5, 10, 12.5 then 15mg monthly.

## 2023-10-12 ENCOUNTER — TELEPHONE (OUTPATIENT)
Dept: SURGERY | Facility: CLINIC | Age: 43
End: 2023-10-12
Payer: COMMERCIAL

## 2023-10-12 NOTE — TELEPHONE ENCOUNTER
I reached out to patient's [facility] plan today to check on surgery benefit.  I spoke with Nadine SANCHEZ REF# [id number] and was told that it is a plan exclusion for her.  I called her to let her know this and she has decided to work on MWM for the time being.  I did let her know that she can check into her plan at work and see if there is coverage there, she's currently on her 's plan.  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  She has decided to keep her appointments and I have updated them to reflect MWM instead of [place]

## 2023-11-08 ENCOUNTER — OFFICE VISIT (OUTPATIENT)
Dept: SURGERY | Facility: CLINIC | Age: 43
End: 2023-11-08
Payer: COMMERCIAL

## 2023-11-08 ENCOUNTER — LAB (OUTPATIENT)
Dept: LAB | Facility: CLINIC | Age: 43
End: 2023-11-08
Payer: COMMERCIAL

## 2023-11-08 VITALS
HEIGHT: 64 IN | BODY MASS INDEX: 41.48 KG/M2 | SYSTOLIC BLOOD PRESSURE: 136 MMHG | DIASTOLIC BLOOD PRESSURE: 78 MMHG | WEIGHT: 243 LBS

## 2023-11-08 DIAGNOSIS — R11.11 VOMITING WITHOUT NAUSEA, UNSPECIFIED VOMITING TYPE: ICD-10-CM

## 2023-11-08 DIAGNOSIS — F10.90 ALCOHOL USE DISORDER: ICD-10-CM

## 2023-11-08 DIAGNOSIS — R63.8 ABNORMAL CRAVING: ICD-10-CM

## 2023-11-08 DIAGNOSIS — K21.9 GASTROESOPHAGEAL REFLUX DISEASE WITHOUT ESOPHAGITIS: ICD-10-CM

## 2023-11-08 DIAGNOSIS — E78.5 DYSLIPIDEMIA: ICD-10-CM

## 2023-11-08 DIAGNOSIS — M25.561 CHRONIC PAIN OF BOTH KNEES: ICD-10-CM

## 2023-11-08 DIAGNOSIS — Z79.4 TYPE 2 DIABETES MELLITUS WITH OTHER SPECIFIED COMPLICATION, WITH LONG-TERM CURRENT USE OF INSULIN (H): ICD-10-CM

## 2023-11-08 DIAGNOSIS — E88.810 METABOLIC SYNDROME: ICD-10-CM

## 2023-11-08 DIAGNOSIS — E11.69 TYPE 2 DIABETES MELLITUS WITH OTHER SPECIFIED COMPLICATION, WITH LONG-TERM CURRENT USE OF INSULIN (H): ICD-10-CM

## 2023-11-08 DIAGNOSIS — F41.9 ANXIETY: ICD-10-CM

## 2023-11-08 DIAGNOSIS — G89.29 CHRONIC PAIN OF BOTH KNEES: ICD-10-CM

## 2023-11-08 DIAGNOSIS — E66.9 EXTREME OBESITY: Primary | ICD-10-CM

## 2023-11-08 DIAGNOSIS — M25.562 CHRONIC PAIN OF BOTH KNEES: ICD-10-CM

## 2023-11-08 DIAGNOSIS — E66.9 EXTREME OBESITY: ICD-10-CM

## 2023-11-08 DIAGNOSIS — E53.8 LOW SERUM VITAMIN B12: ICD-10-CM

## 2023-11-08 PROBLEM — R11.10 VOMITING: Status: ACTIVE | Noted: 2023-11-08

## 2023-11-08 PROBLEM — E11.9 TYPE 2 DIABETES MELLITUS (H): Status: ACTIVE | Noted: 2023-11-08

## 2023-11-08 LAB
ALBUMIN SERPL BCG-MCNC: 4.1 G/DL (ref 3.5–5.2)
ALP SERPL-CCNC: 71 U/L (ref 35–104)
ALT SERPL W P-5'-P-CCNC: 81 U/L (ref 0–50)
ANION GAP SERPL CALCULATED.3IONS-SCNC: 12 MMOL/L (ref 7–15)
AST SERPL W P-5'-P-CCNC: 39 U/L (ref 0–45)
BILIRUB SERPL-MCNC: 0.2 MG/DL
BUN SERPL-MCNC: 9 MG/DL (ref 6–20)
CALCIUM SERPL-MCNC: 9.2 MG/DL (ref 8.6–10)
CHLORIDE SERPL-SCNC: 103 MMOL/L (ref 98–107)
CREAT SERPL-MCNC: 0.61 MG/DL (ref 0.51–0.95)
DEPRECATED HCO3 PLAS-SCNC: 23 MMOL/L (ref 22–29)
EGFRCR SERPLBLD CKD-EPI 2021: >90 ML/MIN/1.73M2
ERYTHROCYTE [DISTWIDTH] IN BLOOD BY AUTOMATED COUNT: 13.1 % (ref 10–15)
FERRITIN SERPL-MCNC: 65 NG/ML (ref 6–175)
FOLATE SERPL-MCNC: 13 NG/ML (ref 4.6–34.8)
GLUCOSE SERPL-MCNC: 80 MG/DL (ref 70–99)
HCT VFR BLD AUTO: 46.3 % (ref 35–47)
HGB BLD-MCNC: 16 G/DL (ref 11.7–15.7)
MCH RBC QN AUTO: 30.3 PG (ref 26.5–33)
MCHC RBC AUTO-ENTMCNC: 34.6 G/DL (ref 31.5–36.5)
MCV RBC AUTO: 88 FL (ref 78–100)
PLATELET # BLD AUTO: 215 10E3/UL (ref 150–450)
POTASSIUM SERPL-SCNC: 4.4 MMOL/L (ref 3.4–5.3)
PROT SERPL-MCNC: 6.8 G/DL (ref 6.4–8.3)
PTH-INTACT SERPL-MCNC: 42 PG/ML (ref 15–65)
RBC # BLD AUTO: 5.28 10E6/UL (ref 3.8–5.2)
SODIUM SERPL-SCNC: 138 MMOL/L (ref 135–145)
TSH SERPL DL<=0.005 MIU/L-ACNC: 1.56 UIU/ML (ref 0.3–4.2)
VIT B12 SERPL-MCNC: 353 PG/ML (ref 232–1245)
VIT D+METAB SERPL-MCNC: 19 NG/ML (ref 20–50)
WBC # BLD AUTO: 13.2 10E3/UL (ref 4–11)

## 2023-11-08 PROCEDURE — 84443 ASSAY THYROID STIM HORMONE: CPT

## 2023-11-08 PROCEDURE — 36415 COLL VENOUS BLD VENIPUNCTURE: CPT

## 2023-11-08 PROCEDURE — 85027 COMPLETE CBC AUTOMATED: CPT

## 2023-11-08 PROCEDURE — 80053 COMPREHEN METABOLIC PANEL: CPT

## 2023-11-08 PROCEDURE — 83970 ASSAY OF PARATHORMONE: CPT

## 2023-11-08 PROCEDURE — 82607 VITAMIN B-12: CPT

## 2023-11-08 PROCEDURE — 82306 VITAMIN D 25 HYDROXY: CPT

## 2023-11-08 PROCEDURE — 82728 ASSAY OF FERRITIN: CPT

## 2023-11-08 PROCEDURE — 99205 OFFICE O/P NEW HI 60 MIN: CPT | Performed by: FAMILY MEDICINE

## 2023-11-08 PROCEDURE — 99000 SPECIMEN HANDLING OFFICE-LAB: CPT

## 2023-11-08 PROCEDURE — 82746 ASSAY OF FOLIC ACID SERUM: CPT

## 2023-11-08 PROCEDURE — 84681 ASSAY OF C-PEPTIDE: CPT

## 2023-11-08 PROCEDURE — 84425 ASSAY OF VITAMIN B-1: CPT | Mod: 90

## 2023-11-08 RX ORDER — LOSARTAN POTASSIUM 25 MG/1
TABLET ORAL
COMMUNITY

## 2023-11-08 RX ORDER — BUPROPION HYDROCHLORIDE 150 MG/1
150 TABLET ORAL EVERY MORNING
Qty: 90 TABLET | Refills: 3 | Status: SHIPPED | OUTPATIENT
Start: 2023-11-08

## 2023-11-08 RX ORDER — ONDANSETRON 4 MG/1
TABLET, ORALLY DISINTEGRATING ORAL
COMMUNITY
Start: 2023-02-21

## 2023-11-08 RX ORDER — INSULIN LISPRO 100 [IU]/ML
INJECTION, SOLUTION INTRAVENOUS; SUBCUTANEOUS
COMMUNITY

## 2023-11-08 RX ORDER — NALTREXONE HYDROCHLORIDE 50 MG/1
TABLET, FILM COATED ORAL
Qty: 45 TABLET | Refills: 3 | Status: SHIPPED | OUTPATIENT
Start: 2023-11-08

## 2023-11-08 NOTE — LETTER
"    2023         RE: Krystle Hernández  [address]  [place] [address]        Dear Colleague,    Thank you for referring your patient, Krystle Hernández, to the [facility] [place]. Please see a copy of my visit note below.        New Medical Weight Management Consult    PATIENT:  Krystle Hernández  MRN:         [id number]  :         1980  DANIEL:         2023    Dear Dr. Graves,    I had the pleasure of seeing your patient, Krystle Hernández. Full intake/assessment was done to determine barriers to weight loss success and develop a treatment plan. Krystle Hernández is a 43 year old female interested in treatment of medical problems associated with excess weight. She has a height of 5' 3.5\", a weight of 243 lbs 0 oz, and the calculated Body mass index is 42.37 kg/m .    ASSESSMENT/PLAN:  Chronic Pain limiting physical activity  YAKOV not using CPAP  Insulin resistance/PCOS  Excess Alcohol Intake affecting health  Fatty liver  Persistent vomiting since death of pre-term son in     Consider outpatient addiction counseling-[employer] consider  C-peptide-assess endogenous insulin secretion  Labs re: fatigue  Pt.believes insurance will cover bariatric surgery with medical indication-will check with [facility] [employer] if able  Strength training discussed  Wellbutrin/Naltrexone  Tirzepatide to bring down sugars  Discussed reactive hypoglycemia which Patti describes. Reminded protein first, keep simple sugars low and avoid prolonged period of time between meals.  Hx Metformin did not tolerate  Has CGM which has been helpful      She has the following co-morbidities:        2023    10:54 AM   --   I have the following health issues associated with obesity Type II Diabetes    High Blood Pressure    High Cholesterol    Sleep Apnea    Polycystic Ovarian Syndrome    GERD (Reflux)    Fatty Liver    Osteoarthritis (joint disease)   I have the following symptoms associated with " "obesity Knee Pain    Back Pain    Fatigue    Hip Pain    Irregular Menstral Cycle           11/7/2023    10:54 AM   Patient Goals   If yes, please indicate which surgery? Hernia, knee replacement           11/7/2023    10:54 AM   Referring Provider   Please name the provider who referred you to Medical Weight Management  If you do not know, please answer \"I Don't Know\" [place]           11/7/2023    10:54 AM   Weight History   How concerned are you about your weight? Very Concerned   I became overweight After Pregnancy   The following factors have contributed to my weight gain Started on Medication that Caused Weight Gain    Eating Wrong Types of Food    Eating Too Much    Lack of Exercise    Stress   I have tried the following methods to lose weight Watching Portions or Calories    Exercise    Atkins-type Diet (Low Carb/High Protein)    Slim Fast or Other Liquid Diets   My lowest weight since age 19 was 110   My highest weight since age 19 was 270   The most weight I have ever lost was (lbs) 40   I have the following family history of obesity/being overweight My mother is overweight   How has your weight changed over the last year? Gained   How many pounds? 10           11/7/2023    10:54 AM   Diet Recall Review with Patient   If you do eat breakfast, what types of food do you eat? Sausage biscuit   If you do eat lunch, what types of food do you typically eat? Salad school lunch   If you do eat supper, what types of food do you typically eat? Protine, greens, starch   If you do snack, what types of food do you typically eat? Chips, nuts, veggies   How many glasses of juice do you drink in a typical day? 0   How many of glasses of milk do you drink in a typical day? 1   If you do drink milk, what type? 1%   How many 8oz glasses of sugar containing drinks such as Royce-Aid/sweet tea do you drink in a day? 0   How many cans/bottles of sugar pop/soda/tea/sports drinks do you drink in a day? 0   How many " cans/bottles of diet pop/soda/tea or sports drink do you drink in a day? 0   How often do you have a drink of alcohol? 2-3 TImes a Week   If you do drink, how many drinks might you have in a day? 7-9           11/7/2023    10:54 AM   Eating Habits   Generally, my meals include foods like these bread, pasta, rice, potatoes, corn, crackers, sweet dessert, pop, or juice Almost Everyday   Generally, my meals include foods like these fried meats, brats, burgers, french fries, pizza, cheese, chips, or ice cream Once a Week   Eat fast food (like McDonalds, [employer], [employer]) Less Than Weekly   Eat at a buffet or sit-down restaurant Less Than Weekly   Eat most of my meals in front of the TV or computer Almost Everyday   Often skip meals, eat at random times, have no regular eating times Once a Week   Rarely sit down for a meal but snack or graze throughout Less Than Weekly   Eat extra snacks between meals A Few Times a Week   Eat most of my food at the end of the day A Few Times a Week   Eat in the middle of the night or wake up at night to eat Never   Eat extra snacks to prevent or correct low blood sugar Almost Everyday   Eat to prevent acid reflux or stomach pain Less Than Weekly   Worry about not having enough food to eat Never   I eat when I am depressed Never   I eat when I am stressed Never   I eat when I am bored A Few Times a Week   I eat when I am anxious Never   I eat when I am happy or as a reward Less Than Weekly   I feel hungry all the time even if I just have eaten A Few Times a Week   Feeling full is important to me Never   I finish all the food on my plate even if I am already full Never   I can't resist eating delicious food or walk past the good food/smell Less Than Weekly   I eat/snack without noticing that I am eating Once a Week   I eat when I am preparing the meal Almost Everyday   I eat more than usual when I see others eating Once a Week   I have trouble not eating sweets, ice cream, cookies,  or chips if they are around the house A Few Times a Week   I think about food all day A Few Times a Week   What foods, if any, do you crave? Chips/Crackers           11/7/2023    10:54 AM   Amount of Food   I feel out of control when eating Never   I eat a large amount of food, like a loaf of bread, a box of cookies, a pint/quart of ice cream, all at once Never   I eat a large amount of food even when I am not hungry Weekly   I eat rapidly Never   I eat alone because I feel embarrassed and do not want others to see how much I have eaten Never   I eat until I am uncomfortably full Weekly   I feel bad, disgusted, or guilty after I overeat Weekly           11/7/2023    10:54 AM   Activity/Exercise History   How much of a typical 12 hour day do you spend sitting? Less Than Half the Day   How much of a typical 12 hour day do you spend lying down? Less Than Half the Day   How much of a typical day do you spend walking/standing? Half the Day   How many hours (not including work) do you spend on the TV/Video Games/Computer/Tablet/Phone? 2-3 Hours   How many times a week are you active for the purpose of exercise? Never   What keeps you from being more active? Pain    Lack of Time    Too tired    Unsure What To Do   How many total minutes do you spend doing some activity for the purpose of exercising when you exercise? None       PAST MEDICAL HISTORY:  Past Medical History:   Diagnosis Date     Anxiety      Diabetes (H)      Dyslipidemia      Extreme obesity      Gastroesophageal reflux disease without esophagitis      History of osteonecrosis     knee reported by patient     Knee pain, bilateral      Metabolic syndrome      PONV (postoperative nausea and vomiting)      Sleep apnea     CPAP (severe)     Type 2 diabetes mellitus (H)      Vomiting     intermittent and persistent for year           11/7/2023    10:54 AM   Work/Social History Reviewed With Patient   My employment status is Full-Time           11/7/2023    10:54  AM   Mental Health History Reviewed With Patient   How often in the past 2 weeks have you felt little interest or pleasure in doing things? For Several Days   Over the past 2 weeks how often have you felt down, depressed, or hopeless? Not at all           11/7/2023    10:54 AM   Sleep History Reviewed With Patient   How many hours do you sleep at night? 7       MEDICATIONS:   Current Outpatient Medications   Medication Sig Dispense Refill     albuterol (PROAIR HFA/PROVENTIL HFA/VENTOLIN HFA) 108 (90 BASE) MCG/ACT Inhaler Inhale 2 puffs into the lungs every 6 hours       buPROPion (WELLBUTRIN XL) 150 MG 24 hr tablet Take 1 tablet (150 mg) by mouth every morning 90 tablet 3     cyanocobalamin (VITAMIN B-12) 1000 MCG sublingual tablet Place 1 tablet (1,000 mcg) under the tongue daily 90 tablet 3     insulin lispro (HUMALOG KWIKPEN) 100 UNIT/ML (1 unit dial) KWIKPEN Inject Subcutaneous 3 times daily (before meals)       liraglutide (VICTOZA) 18 MG/3ML solution Inject 1.2 mg Subcutaneous daily       LORazepam (ATIVAN) 0.5 MG tablet lorazepam 0.5 mg tablet   1-2 tablet twice daily for severe anxiety   One month supply       naltrexone (DEPADE/REVIA) 50 MG tablet Take 1/2 tablet with a meal 45 tablet 3     omeprazole (PRILOSEC) 40 MG DR capsule omeprazole 40 mg capsule,delayed release       ondansetron (ZOFRAN ODT) 4 MG ODT tab        rosuvastatin (CRESTOR) 20 MG tablet Take 20 mg by mouth daily       Sharps Container (SHARPS-A-GATOR LOCKING BRACKET) MISC CPAP for home use at pressure of 12-20.  Heated humidifier x1, Humidifier chamber x1, Heated tubing x1, Full face mask with cushion x1, Headgear x1, Filters: Disposable x1 pack, Reusable x1pk, Length of Need: 99 months, Frequency of use: Daily  Refills: 11.       tirzepatide (MOUNJARO) 2.5 MG/0.5ML pen Inject 2.5 mg Subcutaneous once a week for 28 days 2 mL 0     tirzepatide (MOUNJARO) 5 MG/0.5ML pen Inject 5 mg Subcutaneous once a week for 28 days 2 mL 0     tirzepatide  "(MOUNJARO) 7.5 MG/0.5ML pen Inject 7.5 mg Subcutaneous once a week for 28 days 2 mL 0     diazepam (VALIUM) 5 MG tablet For claustrophia associated with MRI, may take 5mg Valium 30 minutes prior to MRI. Do not take Valium until directed by MRI tech. May repeat Valium 5mg dose once for a total of 10mg of Valium prior to MRI. MUST HAVE A  home from MRI. (Patient not taking: Reported on 11/8/2023) 2 tablet 0     losartan (COZAAR) 25 MG tablet Take 1 tablet every day by oral route. (Patient not taking: Reported on 11/8/2023)       ondansetron (ZOFRAN) 4 MG tablet  (Patient not taking: Reported on 11/8/2023)         ALLERGIES:   Allergies   Allergen Reactions     Codeine Anaphylaxis, Difficulty breathing, Hives, Itching, Other (See Comments), Rash and Shortness Of Breath     Comment: Throt swelling, Description:        Imidazole Antifungals Anaphylaxis     Metronidazole Anaphylaxis, Difficulty breathing, Hives, Itching, Other (See Comments), Rash, Shortness Of Breath and Swelling     Comment: Throat swelling, Description:   Comment: Throat swelling, Description:          PHYSICAL EXAM:  /78   Ht 1.613 m (5' 3.5\")   Wt 110.2 kg (243 lb)   BMI 42.37 kg/m      Waist circumference: 54 cm (hips 51.5)    Wt Readings from Last 4 Encounters:   11/08/23 110.2 kg (243 lb)   04/18/23 105 kg (231 lb 7.7 oz)   02/02/22 112.2 kg (247 lb 5.7 oz)   12/13/21 111.1 kg (245 lb)   Pleasant and in no distress. Accompanied by her   Neck 18\" Mallampati 3+  Heart regular  Lungs clear  Abdominal circumference 54\"  Trace Bilateral BRIAN  A & O x 3  HEENT: NCAT, mucous membranes moist  Respirations unlabored  Euthymic      FOLLOW-UP:   scheduled.    TIME: 70 min spent on evaluation, management, counseling, education, & motivational interviewing     Sincerely,    Tamanna Devine MD            Again, thank you for allowing me to participate in the care of your patient.        Sincerely,        Tamanna Devine, " MD

## 2023-11-08 NOTE — PROGRESS NOTES
"    New Medical Weight Management Consult    PATIENT:  Krystle Hernández  MRN:         [id number]  :         1980  DANIEL:         2023    Dear Dr. Graves,    I had the pleasure of seeing your patient, Krystle Hernández. Full intake/assessment was done to determine barriers to weight loss success and develop a treatment plan. Krystle Hernández is a 43 year old female interested in treatment of medical problems associated with excess weight. She has a height of 5' 3.5\", a weight of 243 lbs 0 oz, and the calculated Body mass index is 42.37 kg/m .    ASSESSMENT/PLAN:  Chronic Pain limiting physical activity  YAKOV not using CPAP  Insulin resistance/PCOS  Excess Alcohol Intake affecting health  Fatty liver  Persistent vomiting since death of pre-term son in     Consider outpatient addiction [email or web address] consider  C-peptide-assess endogenous insulin secretion  Labs re: fatigue  Pt.believes insurance will cover bariatric surgery with medical indication-will check with [facility] [employer] if able  Strength training discussed  Wellbutrin/Naltrexone  Tirzepatide to bring down sugars  Discussed reactive hypoglycemia which Patti describes. Reminded protein first, keep simple sugars low and avoid prolonged period of time between meals.  Hx Metformin did not tolerate  Has CGM which has been helpful      She has the following co-morbidities:        2023    10:54 AM   --   I have the following health issues associated with obesity Type II Diabetes    High Blood Pressure    High Cholesterol    Sleep Apnea    Polycystic Ovarian Syndrome    GERD (Reflux)    Fatty Liver    Osteoarthritis (joint disease)   I have the following symptoms associated with obesity Knee Pain    Back Pain    Fatigue    Hip Pain    Irregular Menstral Cycle           2023    10:54 AM   Patient Goals   If yes, please indicate which surgery? Hernia, knee replacement           2023    10:54 AM   Referring Provider   Please name the " "provider who referred you to Medical Weight Management  If you do not know, please answer \"I Don't Know\" [facility] [place] [facility] [place]           11/7/2023    10:54 AM   Weight History   How concerned are you about your weight? Very Concerned   I became overweight After Pregnancy   The following factors have contributed to my weight gain Started on Medication that Caused Weight Gain    Eating Wrong Types of Food    Eating Too Much    Lack of Exercise    Stress   I have tried the following methods to lose weight Watching Portions or Calories    Exercise    Atkins-type Diet (Low Carb/High Protein)    Slim Fast or Other Liquid Diets   My lowest weight since age 19 was 110   My highest weight since age 19 was 270   The most weight I have ever lost was (lbs) 40   I have the following family history of obesity/being overweight My mother is overweight   How has your weight changed over the last year? Gained   How many pounds? 10           11/7/2023    10:54 AM   Diet Recall Review with Patient   If you do eat breakfast, what types of food do you eat? Sausage biscuit   If you do eat lunch, what types of food do you typically eat? Salad school lunch   If you do eat supper, what types of food do you typically eat? Protine, greens, starch   If you do snack, what types of food do you typically eat? Chips, nuts, veggies   How many glasses of juice do you drink in a typical day? 0   How many of glasses of milk do you drink in a typical day? 1   If you do drink milk, what type? 1%   How many 8oz glasses of sugar containing drinks such as Royce-Aid/sweet tea do you drink in a day? 0   How many cans/bottles of sugar pop/soda/tea/sports drinks do you drink in a day? 0   How many cans/bottles of diet pop/soda/tea or sports drink do you drink in a day? 0   How often do you have a drink of alcohol? 2-3 TImes a Week   If you do drink, how many drinks might you have in a day? 7-9           11/7/2023    10:54 AM   Eating Habits   Generally, my meals " include foods like these bread, pasta, rice, potatoes, corn, crackers, sweet dessert, pop, or juice Almost Everyday   Generally, my meals include foods like these fried meats, brats, burgers, french fries, pizza, cheese, chips, or ice cream Once a Week   Eat fast food (like McDonalds, [employer], [employer]) Less Than Weekly   Eat at a buffet or sit-down restaurant Less Than Weekly   Eat most of my meals in front of the TV or computer Almost Everyday   Often skip meals, eat at random times, have no regular eating times Once a Week   Rarely sit down for a meal but snack or graze throughout Less Than Weekly   Eat extra snacks between meals A Few Times a Week   Eat most of my food at the end of the day A Few Times a Week   Eat in the middle of the night or wake up at night to eat Never   Eat extra snacks to prevent or correct low blood sugar Almost Everyday   Eat to prevent acid reflux or stomach pain Less Than Weekly   Worry about not having enough food to eat Never   I eat when I am depressed Never   I eat when I am stressed Never   I eat when I am bored A Few Times a Week   I eat when I am anxious Never   I eat when I am happy or as a reward Less Than Weekly   I feel hungry all the time even if I just have eaten A Few Times a Week   Feeling full is important to me Never   I finish all the food on my plate even if I am already full Never   I can't resist eating delicious food or walk past the good food/smell Less Than Weekly   I eat/snack without noticing that I am eating Once a Week   I eat when I am preparing the meal Almost Everyday   I eat more than usual when I see others eating Once a Week   I have trouble not eating sweets, ice cream, cookies, or chips if they are around the house A Few Times a Week   I think about food all day A Few Times a Week   What foods, if any, do you crave? Chips/Crackers           11/7/2023    10:54 AM   Amount of Food   I feel out of control when eating Never   I eat a large amount  of food, like a loaf of bread, a box of cookies, a pint/quart of ice cream, all at once Never   I eat a large amount of food even when I am not hungry Weekly   I eat rapidly Never   I eat alone because I feel embarrassed and do not want others to see how much I have eaten Never   I eat until I am uncomfortably full Weekly   I feel bad, disgusted, or guilty after I overeat Weekly           11/7/2023    10:54 AM   Activity/Exercise History   How much of a typical 12 hour day do you spend sitting? Less Than Half the Day   How much of a typical 12 hour day do you spend lying down? Less Than Half the Day   How much of a typical day do you spend walking/standing? Half the Day   How many hours (not including work) do you spend on the TV/Video Games/Computer/Tablet/Phone? 2-3 Hours   How many times a week are you active for the purpose of exercise? Never   What keeps you from being more active? Pain    Lack of Time    Too tired    Unsure What To Do   How many total minutes do you spend doing some activity for the purpose of exercising when you exercise? None       PAST MEDICAL HISTORY:  Past Medical History:   Diagnosis Date    Anxiety     Diabetes (H)     Dyslipidemia     Extreme obesity     Gastroesophageal reflux disease without esophagitis     History of osteonecrosis     knee reported by patient    Knee pain, bilateral     Metabolic syndrome     PONV (postoperative nausea and vomiting)     Sleep apnea     CPAP (severe)    Type 2 diabetes mellitus (H)     Vomiting     intermittent and persistent for year           11/7/2023    10:54 AM   Work/Social History Reviewed With Patient   My employment status is Full-Time           11/7/2023    10:54 AM   Mental Health History Reviewed With Patient   How often in the past 2 weeks have you felt little interest or pleasure in doing things? For Several Days   Over the past 2 weeks how often have you felt down, depressed, or hopeless? Not at all           11/7/2023    10:54 AM    Sleep History Reviewed With Patient   How many hours do you sleep at night? 7       MEDICATIONS:   Current Outpatient Medications   Medication Sig Dispense Refill    albuterol (PROAIR HFA/PROVENTIL HFA/VENTOLIN HFA) 108 (90 BASE) MCG/ACT Inhaler Inhale 2 puffs into the lungs every 6 hours      buPROPion (WELLBUTRIN XL) 150 MG 24 hr tablet Take 1 tablet (150 mg) by mouth every morning 90 tablet 3    cyanocobalamin (VITAMIN B-12) 1000 MCG sublingual tablet Place 1 tablet (1,000 mcg) under the tongue daily 90 tablet 3    insulin lispro (HUMALOG KWIKPEN) 100 UNIT/ML (1 unit dial) KWIKPEN Inject Subcutaneous 3 times daily (before meals)      liraglutide (VICTOZA) 18 MG/3ML solution Inject 1.2 mg Subcutaneous daily      LORazepam (ATIVAN) 0.5 MG tablet lorazepam 0.5 mg tablet   1-2 tablet twice daily for severe anxiety   One month supply      naltrexone (DEPADE/REVIA) 50 MG tablet Take 1/2 tablet with a meal 45 tablet 3    omeprazole (PRILOSEC) 40 MG DR capsule omeprazole 40 mg capsule,delayed release      ondansetron (ZOFRAN ODT) 4 MG ODT tab       rosuvastatin (CRESTOR) 20 MG tablet Take 20 mg by mouth daily      Sharps Container (SHARPS-A-GATOR LOCKING BRACKET) MISC CPAP for home use at pressure of 12-20.  Heated humidifier x1, Humidifier chamber x1, Heated tubing x1, Full face mask with cushion x1, Headgear x1, Filters: Disposable x1 pack, Reusable x1pk, Length of Need: 99 months, Frequency of use: Daily  Refills: 11.      tirzepatide (MOUNJARO) 2.5 MG/0.5ML pen Inject 2.5 mg Subcutaneous once a week for 28 days 2 mL 0    tirzepatide (MOUNJARO) 5 MG/0.5ML pen Inject 5 mg Subcutaneous once a week for 28 days 2 mL 0    tirzepatide (MOUNJARO) 7.5 MG/0.5ML pen Inject 7.5 mg Subcutaneous once a week for 28 days 2 mL 0    diazepam (VALIUM) 5 MG tablet For claustrophia associated with MRI, may take 5mg Valium 30 minutes prior to MRI. Do not take Valium until directed by MRI tech. May repeat Valium 5mg dose once for a total  "of 10mg of Valium prior to MRI. MUST HAVE A  home from MRI. (Patient not taking: Reported on 11/8/2023) 2 tablet 0    losartan (COZAAR) 25 MG tablet Take 1 tablet every day by oral route. (Patient not taking: Reported on 11/8/2023)      ondansetron (ZOFRAN) 4 MG tablet  (Patient not taking: Reported on 11/8/2023)         ALLERGIES:   Allergies   Allergen Reactions    Codeine Anaphylaxis, Difficulty breathing, Hives, Itching, Other (See Comments), Rash and Shortness Of Breath     Comment: Throt swelling, Description:       Imidazole Antifungals Anaphylaxis    Metronidazole Anaphylaxis, Difficulty breathing, Hives, Itching, Other (See Comments), Rash, Shortness Of Breath and Swelling     Comment: Throat swelling, Description:   Comment: Throat swelling, Description:          PHYSICAL EXAM:  /78   Ht 1.613 m (5' 3.5\")   Wt 110.2 kg (243 lb)   BMI 42.37 kg/m      Waist circumference: 54 cm (hips 51.5)    Wt Readings from Last 4 Encounters:   11/08/23 110.2 kg (243 lb)   04/18/23 105 kg (231 lb 7.7 oz)   02/02/22 112.2 kg (247 lb 5.7 oz)   12/13/21 111.1 kg (245 lb)   Pleasant and in no distress. Accompanied by her   Neck 18\" Mallampati 3+  Heart regular  Lungs clear  Abdominal circumference 54\"  Trace Bilateral BRIAN  A & O x 3  HEENT: NCAT, mucous membranes moist  Respirations unlabored  Euthymic      FOLLOW-UP:   scheduled.    TIME: 70 min spent on evaluation, management, counseling, education, & motivational interviewing     Sincerely,    Tamanna Devine MD        "

## 2023-11-09 LAB — C PEPTIDE SERPL-MCNC: 7.2 NG/ML (ref 0.9–6.9)

## 2023-11-12 LAB — VIT B1 PYROPHOSHATE BLD-SCNC: 254 NMOL/L

## 2023-12-05 ENCOUNTER — TELEPHONE (OUTPATIENT)
Dept: SURGERY | Facility: CLINIC | Age: 43
End: 2023-12-05

## 2023-12-05 NOTE — TELEPHONE ENCOUNTER
Called patient for check in, patient stated she was contacted by the clinic 12/4 to cancel the appt.

## 2023-12-17 ENCOUNTER — HEALTH MAINTENANCE LETTER (OUTPATIENT)
Age: 43
End: 2023-12-17

## 2024-01-05 ENCOUNTER — VIRTUAL VISIT (OUTPATIENT)
Dept: SURGERY | Facility: CLINIC | Age: 44
End: 2024-01-05
Payer: COMMERCIAL

## 2024-01-05 DIAGNOSIS — E66.01 MORBID OBESITY WITH BMI OF 40.0-44.9, ADULT (H): Primary | ICD-10-CM

## 2024-01-05 PROCEDURE — 97802 MEDICAL NUTRITION INDIV IN: CPT | Mod: 95 | Performed by: DIETITIAN, REGISTERED

## 2024-01-05 NOTE — PROGRESS NOTES
"Krystle Hernández is a 43 year old who is being evaluated via a billable video visit.      Medical Weight Loss Initial Diet Evaluation  Assessment:  This patient was referred by Dr. Devine for MNT as treatment for Morbid obesity.  Krystle is presenting today for a new weight management nutrition consultation. Pt has had an initial appointment with Dr. Devine.    Weight loss medication:  Mounjaro . Wellbutrin/Naltrexone (got really sick with this combo), now just taking Naltrexone    Anthropometrics:    Pt's weight is 240 lb  Initial weight: 243 lb  Weight change: -3 lb  BMI: There is no height or weight on file to calculate BMI.   Ideal body weight: 53.5 kg (118 lb 0.9 oz)  Adjusted ideal body weight: 76.2 kg (168 lb 0.5 oz)  Estimated RMR ([place]-St Jeor equation):  1742 kcals x 1.2 (sedentary) = 2090 kcals (for weight maintenance)  1742 kcals x 1.3 (light active) = 2395 kcals (for weight maintenance)    Recommended Protein Intake: 80-90 grams of protein/day  She aims for about 1400 kcals when tracking in [facility]    Medical History:  Patient Active Problem List   Diagnosis    Gastroesophageal reflux disease without esophagitis    Extreme obesity    Dyslipidemia    Metabolic syndrome    Knee pain, bilateral    Vomiting    Type 2 diabetes mellitus (H)      Diabetes: Wears a Dexcom  HbA1c:  No results found for: \"HGBA1C\"    Nutrition History:   Weight loss history: Keto - lost 45 lb, cholesterol went really high on this diet, Optavia (lost a little weight but was hungry all the time   Getting tired of eating the same old things and looking for new food ideas  Gets nauseous feeling some days  -Getting bariatric surgery would be a last resort for her    Dietary Recall:  Breakfast: egg OR string cheese, a scoop of PB helps regulate blood sugar  Lunch: Salad bar at school OR chicken tenders from hot lunch  Dinner: Doesn't do starch or pasta OR broccoli, white chicken breast meat in a can OR pizza (eats the meat and cheese) will " "leave the cheese     Doesn't like red meat - will have white chicken breast canned  Typical Snacks: \"not a snacky person\", giant container of nuts in desk at school, will have handful of pecans/nuts a few times per week    Beverages:   Coffee: cream  Green Tea  Water    Nutrition Diagnosis (PES statement):   Morbid obesity related to excessive energy intake as evidence by BMI of 42.37    Nutrition Intervention  Food and/or Nutrient Delivery   Placed emphasis on importance of developing a healthy meal routine, aiming for 3 meals a day and no snacks.  Discussed using a protein supplement as a meal replacement.  Nutrition Education   Discussed with patient how to build a meal: the importance of including a lean/low fat protein at each meal  Educated on sources of lean protein, portion sizes, the amount of grams found in each source. Recommend patient to aim for 20-30g protein at each meal.  Discussed the importance of adequate hydration, with emphasis on drinking 64oz of water or zero calorie beverages per day.    Goals established by patient:   Continue tracking in [facility]  Find new food ideas  80-90g protein per day    Handouts provided:  Recipe Resources  Meal Ideas  Snack Ideas    Assessment/Plan:    Pt will follow up in 4 month(s) with bariatrician and 1 month(s) with dietitian.    Video-Visit Details    Type of service:  Video Visit    Video Start Time (time video started): 4:00 pm    Video End Time (time video stopped): 4:37 pm    Originating Location (pt. Location): Home      Distant Location (provider location):  Off-site    Mode of Communication:  Video Conference via tidyWell    Physician has received verbal consent for a Video Visit from the patient? Yes      Zelda Murdock RD     "

## 2024-01-05 NOTE — LETTER
"    1/5/2024         RE: Krystle Hernández  [address] [facility] [address]        Dear Colleague,    Thank you for referring your patient, Krystle Hernández, to the [facility] [place]. Please see a copy of my visit note below.    Krystle Hernández is a 43 year old who is being evaluated via a billable video visit.      Medical Weight Loss Initial Diet Evaluation  Assessment:  This patient was referred by Dr. Devine for MNT as treatment for Morbid obesity.  Krystle is presenting today for a new weight management nutrition consultation. Pt has had an initial appointment with Dr. Devine.    Weight loss medication:  Mounjaro . Wellbutrin/Naltrexone (got really sick with this combo), now just taking Naltrexone    Anthropometrics:    Pt's weight is 240 lb  Initial weight: 243 lb  Weight change: -3 lb  BMI: There is no height or weight on file to calculate BMI.   Ideal body weight: 53.5 kg (118 lb 0.9 oz)  Adjusted ideal body weight: 76.2 kg (168 lb 0.5 oz)  Estimated RMR ([place]-St Jeor equation):  1742 kcals x 1.2 (sedentary) = 2090 kcals (for weight maintenance)  1742 kcals x 1.3 (light active) = 2395 kcals (for weight maintenance)    Recommended Protein Intake: 80-90 grams of protein/day  She aims for about 1400 kcals when tracking in [facility]    Medical History:  Patient Active Problem List   Diagnosis     Gastroesophageal reflux disease without esophagitis     Extreme obesity     Dyslipidemia     Metabolic syndrome     Knee pain, bilateral     Vomiting     Type 2 diabetes mellitus (H)      Diabetes: Wears a Dexcom  HbA1c:  No results found for: \"HGBA1C\"    Nutrition History:   Weight loss history: Keto - lost 45 lb, cholesterol went really high on this diet, Optavia (lost a little weight but was hungry all the time   Getting tired of eating the same old things and looking for new food ideas  Gets nauseous feeling some days  -Getting bariatric surgery would be a last resort for " "her    Dietary Recall:  Breakfast: egg OR string cheese, a scoop of PB helps regulate blood sugar  Lunch: Salad bar at school OR chicken tenders from hot lunch  Dinner: Doesn't do starch or pasta OR broccoli, white chicken breast meat in a can OR pizza (eats the meat and cheese) will leave the cheese     Doesn't like red meat - will have white chicken breast canned  Typical Snacks: \"not a snacky person\", giant container of nuts in desk at school, will have handful of pecans/nuts a few times per week    Beverages:   Coffee: cream  Green Tea  Water    Nutrition Diagnosis (PES statement):   Morbid obesity related to excessive energy intake as evidence by BMI of 42.37    Nutrition Intervention  Food and/or Nutrient Delivery   Placed emphasis on importance of developing a healthy meal routine, aiming for 3 meals a day and no snacks.  Discussed using a protein supplement as a meal replacement.  Nutrition Education   Discussed with patient how to build a meal: the importance of including a lean/low fat protein at each meal  Educated on sources of lean protein, portion sizes, the amount of grams found in each source. Recommend patient to aim for 20-30g protein at each meal.  Discussed the importance of adequate hydration, with emphasis on drinking 64oz of water or zero calorie beverages per day.    Goals established by patient:   Continue tracking in MFP  Find new food ideas  80-90g protein per day    Handouts provided:  Recipe Resources  Meal Ideas  Snack Ideas    Assessment/Plan:    Pt will follow up in 4 month(s) with bariatrician and 1 month(s) with dietitian.    Video-Visit Details    Type of service:  Video Visit    Video Start Time (time video started): 4:00 pm    Video End Time (time video stopped): 4:37 pm    Originating Location (pt. Location): Home      Distant Location (provider location):  Off-site    Mode of Communication:  Video Conference via [employer]    Physician has received verbal consent for a Video " Visit from the patient? Yes      Zelda Murdock RD         Again, thank you for allowing me to participate in the care of your patient.        Sincerely,        Zelda Murdock RD

## 2024-01-05 NOTE — PATIENT INSTRUCTIONS
Recipe Resources  Websites  www.Spartan Race.RT [employer]kerage Services  www.365Scores.RT Brokerage Services  https://www.Uruut.RT Brokerage Services/recipes-ideas/recipes  https://Agnitusrds.RT Brokerage Services/  www.sparkrecipes.RT Brokerage Services  https://www.Definicaregirl.RT Brokerage Services/    Blogs  https://Ubiquity Global ServicesgoodfoHammerhead Navigatione.net/  Skinnytaste.com  XL HybridsonJambotech.RT Brokerage Services  WellnessShoeSize.Me.RT Brokerage Services  https://www.youNanoNord.com/c/mariaelenacoen - affordable meal prep  **https://mealprepmanG-Snap!.com/ - meal prep recipes      Apps  Mealime  Fooducate  Yummly  Allrecipes Dinner Spinner  [facility] Good Food  Myfitnesspal     Books  The Volumetrics Eating Plan- Alie Phillips, Ph. D  The Easy 5- Ingredient Healthy Cookbook: Simple Recipes to Make Healthy Eating Delicious- Deni Vela, MS, RD, CDN  The 30 Minute Mediterranean Diet Cookbook- Bing Dewitt MS RDN and July Ventura RDN  Weeknight Wonders: Delicious, Healthy Dinners in 30 min or Less- Tahmina Lao  Smart Meal Prep for Beginners- Deni Vela MS, RD, CDN         Snack Ideas   1 hardboiled egg with   cup berries  1 small apple with 1 hardboiled egg  10 almonds with   cup berries  2 clementines with 1 light string cheese  1 light string cheese with   sliced apple  1 light string cheese wrapped in 2 slices of turkey  5 100% whole wheat crackers (e.g. Triscuit) with 1 light string cheese    c. cottage cheese with   cup fruit and 1 Tbsp sunflower seeds     cup cottage cheese with   of an avocado     can tuna fish with 1 cup sliced cucumbers   2 oz turkey slices with 1 cup carrots  1 container (6 oz) of low sugar (less than 10 grams of sugar) greek yogurt   3 Tablespoons of hummus with 1 cup sliced bell peppers, carrots or vegetable of your choice  4 Tablespoons ranch dip made with plain Greek Yogurt and 3 mini cucumbers  1/4 cup nuts (any kind)  1 Tablespoon peanut butter with 1 stalk celery   1 dill pickle wrapped in 1-2 slices of deli ham with 1 tsp of light cream cheese  5 100% whole wheat crackers (e.g. Triscuit) with 1 tsp each of guacamole/avocado topped with a cherry tomato -  season with pepper or Everything Bagel Seasoning     On-the-Go Breakfast Ideas  As of 2015, the latest research shows what a huge impact eating breakfast has on losing weight and feeling your best. People lose more weight when they make breakfast their biggest meal of the day compared to Dinner, but even if you cannot go to that degree, getting a breakfast that has at least 20 grams of protein and even a moderate amount of fat is ideal for maintaining good energy through the day and limits overeating in the evening hours.  The following are some quick and easy suggestions for at least getting something of substance into your body in the morning.  Enjoy!    Eating breakfast within 90 minutes of waking up is an important part of taking care of your body on a restricted calorie diet plan.  After sleeping for hours, your body is in need of fuel.  An ideal breakfast is a combination of protein, whole-grain carbohydrates, or fruit.  Here s why:    -Protein digests very slowly in the body, helping you feel more satisfied.  -Whole grains provide dietary fiber, which also digests slowly and helps keep your gut clean.  -Fruit is a great source of vitamins, minerals, and fiber.     Each one of these breakfast combinations has between 200-300 calories and 15-20 grams of protein.  Feel free to mix and match!    Bone Broth (chicken bone broth or beef bone broth) is a great way to boost protein content. 8oz of bone broth will typically have 9-12grams of protein for 40kcal of energy.    Protein: Choose  -1/2 cup low-fat cottage cheese  -2 hard boiled eggs , or one cooked in olive oil (low/slow heat).  -1 low fat string cheese stick  -1 Tablespoon natural peanut butter  -Aviary vegetarian sausage tom (found in freezer section)  -1 slice lowfat cheese  -6 oz 2% or lowfat Greek yogurt, such as Fage or Oikos.    PLUS    Whole Grains:  Choose   -1 whole wheat English muffin  -1 whole wheat yonathan, half  -1/2 Fiber One frozen  muffin, thawed  -1/2 Fiber One toaster pastry  -1 whole wheat bagel thin  -1/2 cup Kashi cereal  -1 Kashi waffle (or other whole grain high-fiber waffle)  Aim for whole grain/sprouted breads with at least 3g of fiber/slice if having bread. [place] is one such brand.    OR    Fruit: Choose  -1/3 cup blueberries  -1/2 banana (or a plantain- similar to a banana, yet smaller)  -1/2 cup cantaloupe cubes  -1 small apple  -1 small orange  -1/2 cup strawberries  -handful raspberries/blackberries (each berry is about 1 calorie).    *Adapted from Diabetes Living, Fall 20    Ten Breakfasts Under 250 calories    Ideally, getting between 350-600 calories  (depending on starting height and weight)for breakfast is ideal for avoiding hunger later in the day, adjust/add to the following accordingly:    One- 250 calories, 8.5 g protein  1 slice whole-grain toast   1 Tbsp peanut butter    banana    Two- 250 calories, 8 g protein    cup nonfat/lowfat yogurt  1/3rd cup diced no-sugar peaches  1/3rd cup cereal (like Special K, Cheerios, or bran flakes)    Three- 250 calories, 25 g protein  1 egg scrambled with 1 oz skim milk    cup shredded cheddar    whole grain English muffin  1 oz Morris medina  1 tsp margarine spread    Four- 225 calories, 25 g protein  1/2 cup Kashi Go-Lean cereal    cup skim milk mixed with 1 scoop Bariatric Advantage protein powder    cup no-sugar diced pears    Five- 250 calories, 20 g protein    cup oatmeal prepared with skim milk, 1 scoop protein powder, and sugar-free maple syrup    Six- 200 calories, 5 g protein  1 whole grain waffle, toasted  1 tablespoon creamy peanut or almond butter    Seven-  250 calories, 19 g protein  Breakfast sandwich: 1 slice whole grain toast, cut in half.  Add 1 scrambled egg and one slice cheddar  cheese.    Eight-  250 calories, 15 g protein  2 eggs scrambled with 1/3 cup frozen spinach (heat before adding to eggs) and 2 tablespoons low fat cream cheese.    Nine-  150  calories, 15 g protein  2/3rd cup cottage cheese    cup cantaloupe    Ten- 200 calories, 20 g protein  Fruit smoothie made with 4 oz. nonfat Greek yogurt,   cup berries, 1 scoop protein powder, and 4 oz skim milk.    Ten Lunches Under 250 Calories    Aim for lunch to be around 300-400 calories a day when trying to lose weight and get that protein in!    One- 200 calories, 11 g protein  1/3 cup tuna salad made with light belle on 1 slice whole grain bread  1 small peeled apple    Two- 250 calories, 16 g protein  1/3 cup lowfat cottage cheese    cup cooked green beans    small fruit cocktail (in natural juice)    Three- 200 calories, 11 g protein    grilled cheese sandwich on whole grain bread with lowfat cheese  2/3rd cup of tomato soup    Four- 250 calories, 22 g protein  Deli wrap: 1 oz sliced turkey, 1 oz sliced ham, 1 oz sliced chicken rolled up with 1 slice low-fat cheese  1 small orange    Five- 250 calories, 28 g protein  2/3rd cup chili with 1 oz shredded cheese  4 saltine crackers    Six- 250 calories, 22 g protein  1 cup fresh spinach with 2 oz chicken, 1/3rd cup mandarin oranges, and 2 tablespoons sliced almonds with 1 tablespoon  vinaigrette dressing    Seven- 200 calories, 11 g protein  1 Tbsp sugar-free preserves and 1 Tbsp peanut butter on 1 slice whole grain toast    cup nonfat/lowfat Greek yogurt    Eight- 250 calories, 18 g protein  1 small soft-shell chicken taco with 1 oz shredded cheese, lettuce, tomato, salsa, and 1 Tbsp light sour cream    cup black beans    Nine- 225 calories, 13 g protein  2 ounces baked chicken  1/4 cup mashed potatoes    cup green beans    Ten- 200 calories, 21 g protein  Deli yonathan: 2 oz roast beef or other deli meat with 1 tsp Ricky mayonnaise and sliced tomato, onion, and lettuce  1/3rd cup cottage cheese      Ten Dinners Under 300 calories    If you're eating a large breakfast and medium lunch, keep dinner small.  300-400 calories is ideal for most people depending on  their caloric needs.    One- 300 calories, 12 g protein  1-inch thick slice of turkey meatloaf    cup baked butternut squash    Two- 200 calories, 9 g protein  Bread-less BLT: 3 slices turkey medina, sliced tomato, wrapped in a large lettuce leaf    cup peeled fruit    Three- 275 calories, 36 g protein  3 oz roasted chicken    cup cooked broccoli    cup shredded cheddar cheese    cup unsweetened applesauce    Four- 200 calories, 25 g protein  3 oz baked tilapia  1/3rd cup cooked carrots    cup yogurt    Five- 250 calories, 20 g protein  Grilled ham  n  Swiss: spread 2 tsp ghee or butter on 1 slice of whole grain bread.  Cut bread in half, layer 2 oz deli ham with 1 piece of Swiss cheese and grill until cheese is melted.    cup cooked vegetables    Six- 250 calories, 18 g protein  Vegetarian cheeseburger: 1 Boca cheeseburger topped with lettuce, onion, tomato, and ketchup/mustard    cup sweet potato fries    Seven- 250 calories, 18 g protein  Pork pot roast: 2 oz roasted pork loin, 1/3rd cup roasted carrots,   medium potato, cooked with   cup gravy    Eight- 330 calories, 25 g protein  2 oz meatballs (about 2 small meatballs)    cup spaghetti sauce  1/2 piece toast topped with 1 tsp ghee or butterand topped with garlic powder, toasted in oven    Nine- 250 calories, 16 g protein  Mexican pizza: one 8  corn tortilla topped with 2 oz chicken,   cup salsa, 2 tablespoons black beans, 2 tablespoons shredded cheese.  Bake until cheese is melted.    Ten- 250 calories, 22 g protein  Shrimp stir-pepper: 3 oz cooked shrimp, 1/6th onion,   pepper,   cup chopped carrots sautéed in 1 tablespoon olive oil, topped with 2 tablespoons stir pepper sauce and a pinch of sesame seeds    Quick and Easy Meals    Salad kit with rotisserie chicken, eggs, lunch meat, beans or tuna    Chicken nuggets on top of Caesar salad kit     Lunch meat sandwich or wrap with veggies (sugar snap peas, bell pepper slices, carrot sticks, celery, sliced cucumber,  cherry tomatoes, etc.)    Greek or light yogurt with a handful of nuts and fruit    Cottage cheese, cherry tomatoes and Triscuit crackers    Refried bean and cheese quesadilla on whole wheat tortilla    Can of Progresso Light or Cambells Well Yes soup - add additional leftover protein like chicken to boost protein    [place] cakes pancake or waffles with peanut butter or berries    Baked sweet potato with black beans and salsa and a side salad    Adult lunchable: lunch meat, string cheese, crackers and veggies    Tuna Cucumber Boats: cut cucumber in half, scoop out cucumber seeds, fill with mixture of tuna, light belle, jose francisco mustard, red onion, banana peppers, dried dill, salt and pepper    Protein pasta salad: whole wheat or bean pasta with chicken sausage, broccoli and italian dressing    Egg sandwich on english muffin: egg, slice of cheese and piece of ham    Grilled cheese and turkey sandwich with a side salad or cup of soup    BBQ Flatbread: Flat Out high protein flatbread with rotisserie chicken, BBQ sauce and red onion, topped with mozzarella cheese and baked in the oven    Barton Chicken Wrap: Rotisserie chicken warmed up with See's Hot Sauce in a medium size tortilla with light ranch dressing. Add mixed greens, red onion, diced tomato, 2% shredded cheddar cheese.    Cottage cheese Pizza Bowl - mix cottage cheese, marinara, mozzarella cheese, turkey pepperonis, italian seasoning, onions, bell peppers, heated up, serve with bell peppers and/or crackers    Cottage cheese Taco Bowl - mix cottage cheese, ground beef with taco seasoning, shredded cheese, lettuce, tomato, heated up    Greek cottage cheese bowl - mix cottage cheese, dill, vegetables (cucumber, bell pepper, cucumber, cherry tomatoes), salt and pepper, olives, feta, a little drizzle of olive oil     Whole wheat or bean pasta with pesto and chicken sausage     Sheet Pan Dinner: Chicken sausage, herbed baby potatoes, asparagus, carrots, and onions  roasted in olive oil    Omelet with chicken or beans, low-fat cheese, veggies (bell pepper, tomato, spinach, mushroom, onions), and salsa    Whole wheat toast topped with mashed avocado, sliced tomato, and a fried egg     Berry Salad: Spinach/lettuce, berries, grilled chicken/salmon/tofu, low-fat feta cheese, with vinaigrette dressing     Cristino-Richard Salad: Mixed greens, chicken breast, black beans, corn, diced tomatoes, green onions, cheddar cheese, cilantro, crushed tortilla chips, and a dressing of light ranch mixed with taco seasoning    Sweet potato, bell pepper, chickpeas, onion, and tomato sauteed in light oil with spices of choice (paprika, cumin, sidney, garlic, cayenne, black pepper)    Cranberry Apple Quinoa Salad: quinoa, grilled chicken, diced apple, celery, green onion, unsweetened dried cranberries, and chopped pecans, with a dressing of olive oil, jose francisco mustard, and honey    Asian Cabbage Salad: Sliced green and red cabbage, sliced bell pepper, edamame, shredded carrots, cilantro, slivered almonds, and grilled chicken or tofu, with sidney peanut dressing

## 2024-01-17 ENCOUNTER — TELEPHONE (OUTPATIENT)
Dept: SURGERY | Facility: CLINIC | Age: 44
End: 2024-01-17
Payer: COMMERCIAL

## 2024-01-17 DIAGNOSIS — Z79.4 TYPE 2 DIABETES MELLITUS WITH OTHER SPECIFIED COMPLICATION, WITH LONG-TERM CURRENT USE OF INSULIN (H): Primary | ICD-10-CM

## 2024-01-17 DIAGNOSIS — E11.69 TYPE 2 DIABETES MELLITUS WITH OTHER SPECIFIED COMPLICATION, WITH LONG-TERM CURRENT USE OF INSULIN (H): Primary | ICD-10-CM

## 2024-01-17 NOTE — TELEPHONE ENCOUNTER
Called pt to discuss refill request we rec'd in clinic for Mounjaro 2.5 mg pen. Pt says that she is current on her 2nd week of the 7.5 mg dose and would like to move up when she's done with that. I let her know that I will send in the next 3 doses and she verbalized understanding.    Erika Larson RN, CBN  [facility]  P [phone]  F [phone]

## 2024-01-18 ENCOUNTER — TELEPHONE (OUTPATIENT)
Dept: SURGERY | Facility: CLINIC | Age: 44
End: 2024-01-18
Payer: COMMERCIAL

## 2024-01-18 NOTE — TELEPHONE ENCOUNTER
Prior Authorization Retail Medication Request    Medication/Dose: Mounjaro 10mg/0.5ml Pen-injectors  New/renewal/insurance change PA/secondary ins. PA:  Previously Tried and Failed:  Pt has been taking this medications with good results, increasing dosage.     Key: [id number]    Pharmacy Information (if different than what is on RX)  Name:  [facility]  Phone:  [phone]  Fax:  [phone]

## 2024-01-29 NOTE — TELEPHONE ENCOUNTER
Retail Pharmacy Prior Authorization Team   Phone: [phone]    PA Initiation    Medication: MOUNJARO 10 MG/0.5ML SC SOPN  Insurance Company: [employer] [place] - Phone [phone] Fax [phone]  [facility] [place] [place] [facility] [address]  Filling Pharmacy Phone: [phone]  Filling Pharmacy Fax:    Start Date: 1/29/2024

## 2024-01-30 NOTE — TELEPHONE ENCOUNTER
Prior Authorization Approval    Medication: MOUNJARO 10 MG/0.5ML SC SOPN  Authorization Effective Date: 1/29/2024  Authorization Expiration Date: 1/28/2025  Approved Dose/Quantity:   Reference #:     Insurance Company: [employer] [place] - Phone [phone] Fax [phone]  Expected CoPay: $    CoPay Card Available:      Financial Assistance Needed:   Which Pharmacy is filling the prescription: [facility] [address]  Pharmacy Notified: Yes  Patient Notified: **Instructed pharmacy to notify patient when script is ready to /ship.**

## 2024-02-25 ENCOUNTER — HEALTH MAINTENANCE LETTER (OUTPATIENT)
Age: 44
End: 2024-02-25

## 2024-05-05 ENCOUNTER — HEALTH MAINTENANCE LETTER (OUTPATIENT)
Age: 44
End: 2024-05-05

## 2024-07-01 ENCOUNTER — HOSPITAL ENCOUNTER (OUTPATIENT)
Facility: CLINIC | Age: 44
Discharge: HOME | End: 2024-07-01
Attending: INTERNAL MEDICINE | Admitting: INTERNAL MEDICINE
Payer: COMMERCIAL

## 2024-07-01 ENCOUNTER — ANESTHESIA EVENT (OUTPATIENT)
Dept: SURGERY | Facility: CLINIC | Age: 44
End: 2024-07-01
Payer: COMMERCIAL

## 2024-07-01 ENCOUNTER — APPOINTMENT (OUTPATIENT)
Dept: MRI IMAGING | Facility: CLINIC | Age: 44
End: 2024-07-01
Attending: PHYSICIAN ASSISTANT
Payer: COMMERCIAL

## 2024-07-01 ENCOUNTER — HOSPITAL ENCOUNTER (OUTPATIENT)
Dept: MRI IMAGING | Facility: CLINIC | Age: 44
Discharge: HOME | End: 2024-07-01
Attending: PHYSICIAN ASSISTANT | Admitting: PHYSICIAN ASSISTANT
Payer: COMMERCIAL

## 2024-07-01 ENCOUNTER — ANESTHESIA (OUTPATIENT)
Dept: SURGERY | Facility: CLINIC | Age: 44
End: 2024-07-01
Payer: COMMERCIAL

## 2024-07-01 VITALS
TEMPERATURE: 98.2 F | HEART RATE: 78 BPM | WEIGHT: 223.77 LBS | HEIGHT: 63 IN | DIASTOLIC BLOOD PRESSURE: 100 MMHG | OXYGEN SATURATION: 97 % | RESPIRATION RATE: 14 BRPM | BODY MASS INDEX: 39.65 KG/M2 | SYSTOLIC BLOOD PRESSURE: 137 MMHG

## 2024-07-01 DIAGNOSIS — M25.562 LEFT KNEE PAIN: ICD-10-CM

## 2024-07-01 DIAGNOSIS — M25.561 RIGHT KNEE PAIN: Primary | ICD-10-CM

## 2024-07-01 DIAGNOSIS — M25.561 RIGHT KNEE PAIN: ICD-10-CM

## 2024-07-01 LAB
GLUCOSE BLDC GLUCOMTR-MCNC: 104 MG/DL (ref 70–99)
GLUCOSE BLDC GLUCOMTR-MCNC: 105 MG/DL (ref 70–99)

## 2024-07-01 PROCEDURE — 73721 MRI JNT OF LWR EXTRE W/O DYE: CPT | Mod: 26 | Performed by: RADIOLOGY

## 2024-07-01 PROCEDURE — 250N000009 HC RX 250: Performed by: NURSE ANESTHETIST, CERTIFIED REGISTERED

## 2024-07-01 PROCEDURE — 73721 MRI JNT OF LWR EXTRE W/O DYE: CPT | Mod: 50

## 2024-07-01 PROCEDURE — 999N000141 HC STATISTIC PRE-PROCEDURE NURSING ASSESSMENT

## 2024-07-01 PROCEDURE — 710N000012 HC RECOVERY PHASE 2, PER MINUTE

## 2024-07-01 PROCEDURE — 258N000003 HC RX IP 258 OP 636: Performed by: NURSE ANESTHETIST, CERTIFIED REGISTERED

## 2024-07-01 PROCEDURE — 250N000025 HC SEVOFLURANE, PER MIN

## 2024-07-01 PROCEDURE — 73721 MRI JNT OF LWR EXTRE W/O DYE: CPT | Performed by: ANESTHESIOLOGY

## 2024-07-01 PROCEDURE — 370N000017 HC ANESTHESIA TECHNICAL FEE, PER MIN

## 2024-07-01 PROCEDURE — 82962 GLUCOSE BLOOD TEST: CPT

## 2024-07-01 PROCEDURE — 73721 MRI JNT OF LWR EXTRE W/O DYE: CPT | Performed by: NURSE ANESTHETIST, CERTIFIED REGISTERED

## 2024-07-01 PROCEDURE — 250N000011 HC RX IP 250 OP 636: Performed by: NURSE ANESTHETIST, CERTIFIED REGISTERED

## 2024-07-01 PROCEDURE — 710N000010 HC RECOVERY PHASE 1, LEVEL 2, PER MIN

## 2024-07-01 RX ORDER — DEXAMETHASONE SODIUM PHOSPHATE 4 MG/ML
4 INJECTION, SOLUTION INTRA-ARTICULAR; INTRALESIONAL; INTRAMUSCULAR; INTRAVENOUS; SOFT TISSUE
Status: DISCONTINUED | OUTPATIENT
Start: 2024-07-01 | End: 2024-07-04 | Stop reason: HOSPADM

## 2024-07-01 RX ORDER — ONDANSETRON 4 MG/1
4 TABLET, ORALLY DISINTEGRATING ORAL EVERY 30 MIN PRN
Status: DISCONTINUED | OUTPATIENT
Start: 2024-07-01 | End: 2024-07-04 | Stop reason: HOSPADM

## 2024-07-01 RX ORDER — HYDROMORPHONE HYDROCHLORIDE 1 MG/ML
0.2 INJECTION, SOLUTION INTRAMUSCULAR; INTRAVENOUS; SUBCUTANEOUS EVERY 5 MIN PRN
Status: DISCONTINUED | OUTPATIENT
Start: 2024-07-01 | End: 2024-07-04 | Stop reason: HOSPADM

## 2024-07-01 RX ORDER — DEXAMETHASONE SODIUM PHOSPHATE 4 MG/ML
INJECTION, SOLUTION INTRA-ARTICULAR; INTRALESIONAL; INTRAMUSCULAR; INTRAVENOUS; SOFT TISSUE PRN
Status: DISCONTINUED | OUTPATIENT
Start: 2024-07-01 | End: 2024-07-02

## 2024-07-01 RX ORDER — HYDROMORPHONE HYDROCHLORIDE 1 MG/ML
0.4 INJECTION, SOLUTION INTRAMUSCULAR; INTRAVENOUS; SUBCUTANEOUS EVERY 5 MIN PRN
Status: DISCONTINUED | OUTPATIENT
Start: 2024-07-01 | End: 2024-07-04 | Stop reason: HOSPADM

## 2024-07-01 RX ORDER — FENTANYL CITRATE 50 UG/ML
INJECTION, SOLUTION INTRAMUSCULAR; INTRAVENOUS PRN
Status: DISCONTINUED | OUTPATIENT
Start: 2024-07-01 | End: 2024-07-02

## 2024-07-01 RX ORDER — OXYCODONE HYDROCHLORIDE 10 MG/1
10 TABLET ORAL
Status: DISCONTINUED | OUTPATIENT
Start: 2024-07-01 | End: 2024-07-04 | Stop reason: HOSPADM

## 2024-07-01 RX ORDER — OXYCODONE HYDROCHLORIDE 5 MG/1
5 TABLET ORAL
Status: DISCONTINUED | OUTPATIENT
Start: 2024-07-01 | End: 2024-07-04 | Stop reason: HOSPADM

## 2024-07-01 RX ORDER — LIDOCAINE HYDROCHLORIDE 20 MG/ML
INJECTION, SOLUTION INFILTRATION; PERINEURAL PRN
Status: DISCONTINUED | OUTPATIENT
Start: 2024-07-01 | End: 2024-07-02

## 2024-07-01 RX ORDER — ONDANSETRON 2 MG/ML
4 INJECTION INTRAMUSCULAR; INTRAVENOUS EVERY 30 MIN PRN
Status: DISCONTINUED | OUTPATIENT
Start: 2024-07-01 | End: 2024-07-04 | Stop reason: HOSPADM

## 2024-07-01 RX ORDER — SODIUM CHLORIDE, SODIUM LACTATE, POTASSIUM CHLORIDE, CALCIUM CHLORIDE 600; 310; 30; 20 MG/100ML; MG/100ML; MG/100ML; MG/100ML
INJECTION, SOLUTION INTRAVENOUS CONTINUOUS
Status: DISCONTINUED | OUTPATIENT
Start: 2024-07-01 | End: 2024-07-04 | Stop reason: HOSPADM

## 2024-07-01 RX ORDER — ONDANSETRON 2 MG/ML
INJECTION INTRAMUSCULAR; INTRAVENOUS PRN
Status: DISCONTINUED | OUTPATIENT
Start: 2024-07-01 | End: 2024-07-02

## 2024-07-01 RX ORDER — PROPOFOL 10 MG/ML
INJECTION, EMULSION INTRAVENOUS PRN
Status: DISCONTINUED | OUTPATIENT
Start: 2024-07-01 | End: 2024-07-02

## 2024-07-01 RX ORDER — NALOXONE HYDROCHLORIDE 0.4 MG/ML
0.1 INJECTION, SOLUTION INTRAMUSCULAR; INTRAVENOUS; SUBCUTANEOUS
Status: DISCONTINUED | OUTPATIENT
Start: 2024-07-01 | End: 2024-07-04 | Stop reason: HOSPADM

## 2024-07-01 RX ORDER — FENTANYL CITRATE 50 UG/ML
50 INJECTION, SOLUTION INTRAMUSCULAR; INTRAVENOUS EVERY 5 MIN PRN
Status: DISCONTINUED | OUTPATIENT
Start: 2024-07-01 | End: 2024-07-04 | Stop reason: HOSPADM

## 2024-07-01 RX ORDER — FENTANYL CITRATE 50 UG/ML
25 INJECTION, SOLUTION INTRAMUSCULAR; INTRAVENOUS EVERY 5 MIN PRN
Status: DISCONTINUED | OUTPATIENT
Start: 2024-07-01 | End: 2024-07-04 | Stop reason: HOSPADM

## 2024-07-01 RX ORDER — SODIUM CHLORIDE, SODIUM LACTATE, POTASSIUM CHLORIDE, CALCIUM CHLORIDE 600; 310; 30; 20 MG/100ML; MG/100ML; MG/100ML; MG/100ML
INJECTION, SOLUTION INTRAVENOUS CONTINUOUS PRN
Status: DISCONTINUED | OUTPATIENT
Start: 2024-07-01 | End: 2024-07-02

## 2024-07-01 RX ORDER — PROPOFOL 10 MG/ML
INJECTION, EMULSION INTRAVENOUS CONTINUOUS PRN
Status: DISCONTINUED | OUTPATIENT
Start: 2024-07-01 | End: 2024-07-02

## 2024-07-01 RX ADMIN — FENTANYL CITRATE 50 MCG: 50 INJECTION INTRAMUSCULAR; INTRAVENOUS at 09:22

## 2024-07-01 RX ADMIN — PROPOFOL 90 MCG/KG/MIN: 10 INJECTION, EMULSION INTRAVENOUS at 09:22

## 2024-07-01 RX ADMIN — FENTANYL CITRATE 50 MCG: 50 INJECTION INTRAMUSCULAR; INTRAVENOUS at 09:15

## 2024-07-01 RX ADMIN — Medication 50 MG: at 09:22

## 2024-07-01 RX ADMIN — DEXAMETHASONE SODIUM PHOSPHATE 6 MG: 4 INJECTION, SOLUTION INTRA-ARTICULAR; INTRALESIONAL; INTRAMUSCULAR; INTRAVENOUS; SOFT TISSUE at 09:27

## 2024-07-01 RX ADMIN — MIDAZOLAM 2 MG: 1 INJECTION INTRAMUSCULAR; INTRAVENOUS at 09:09

## 2024-07-01 RX ADMIN — PROPOFOL 150 MG: 10 INJECTION, EMULSION INTRAVENOUS at 09:16

## 2024-07-01 RX ADMIN — SODIUM CHLORIDE, POTASSIUM CHLORIDE, SODIUM LACTATE AND CALCIUM CHLORIDE: 600; 310; 30; 20 INJECTION, SOLUTION INTRAVENOUS at 09:09

## 2024-07-01 RX ADMIN — ONDANSETRON 4 MG: 2 INJECTION INTRAMUSCULAR; INTRAVENOUS at 09:27

## 2024-07-01 RX ADMIN — LIDOCAINE HYDROCHLORIDE 100 MG: 20 INJECTION, SOLUTION INFILTRATION; PERINEURAL at 09:22

## 2024-07-01 ASSESSMENT — ACTIVITIES OF DAILY LIVING (ADL)
ADLS_ACUITY_SCORE: 35

## 2024-07-01 NOTE — ANESTHESIA POSTPROCEDURE EVALUATION
Patient: Krystle Hernández    Procedure: Procedure(s):  Anesthesia out of OR Magnetic Resonance Imaging of left knee without contrast@0845       Anesthesia Type:  General    Note:  Disposition: Outpatient   Postop Pain Control: Uneventful            Sign Out: Well controlled pain   PONV: No   Neuro/Psych: Uneventful            Sign Out: Acceptable/Baseline neuro status   Airway/Respiratory: Uneventful            Sign Out: AIRWAY IN SITU/Resp. Support   CV/Hemodynamics: Uneventful            Sign Out: Acceptable CV status; No obvious hypovolemia; No obvious fluid overload   Other NRE: NONE   DID A NON-ROUTINE EVENT OCCUR? No    Event details/Postop Comments:  No complications.           Last vitals:  Vitals Value Taken Time   BP     Temp     Pulse 87 07/01/24 1053   Resp 11 07/01/24 1053   SpO2 100 % 07/01/24 1053   Vitals shown include unfiled device data.    Electronically Signed By: Robbie Bearden MD  July 1, 2024  10:53 AM

## 2024-07-01 NOTE — ANESTHESIA PROCEDURE NOTES
Airway       Patient location during procedure: OR       Procedure Start/Stop Times: 7/1/2024 9:25 AM  Staff -        CRNA: Darrin Clifford APRN CRNA       Performed By: CRNAIndications and Patient Condition       Indications for airway management: nataly-procedural       Induction type:intravenous       Mask difficulty assessment: 1 - vent by mask    Final Airway Details       Final airway type: endotracheal airway       Successful airway: ETT - single  Endotracheal Airway Details        ETT size (mm): 7.0       Cuffed: yes       Successful intubation technique: video laryngoscopy       VL Blade Size: MAC 3       Grade View of Cords: 1       Adjucts: stylet       Position: Right       Measured from: lips       Secured at (cm): 21       Bite block used: None    Post intubation assessment        Placement verified by: capnometry        Number of attempts at approach: 1       Number of other approaches attempted: 0       Secured with: commercial tube to       Ease of procedure: easy       Dentition: Intact    Medication(s) Administered   Medication Administration Time: 7/1/2024 9:25 AM

## 2024-07-01 NOTE — ANESTHESIA PREPROCEDURE EVALUATION
Anesthesia Pre-Procedure Evaluation    Patient: Krystle Hernández   MRN: [id number] : 1980        Procedure : Procedure(s):  Anesthesia out of OR Magnetic Resonance Imaging of left knee without contrast@0845          Past Medical History:   Diagnosis Date    Anxiety     Diabetes (H)     Dyslipidemia     Extreme obesity     Gastroesophageal reflux disease without esophagitis     History of osteonecrosis     knee reported by patient    Knee pain, bilateral     Metabolic syndrome     PONV (postoperative nausea and vomiting)     Sleep apnea     CPAP (severe)    Type 2 diabetes mellitus (H)     Vomiting     intermittent and persistent for year      Past Surgical History:   Procedure Laterality Date    ANESTHESIA OUT OF OR MRI N/A 2022    Procedure: ANESTHESIA OUT OF OR MAGNETIC RESONANCE IMAGING OF RIGHT HIP  AND LEFT HIP WITHOUT CONTRAST,RIGHT KNEE WITHOUT CONTRAST@1200;  Surgeon: GENERIC ANESTHESIA PROVIDER;  Location: [facility] OR    ANESTHESIA OUT OF OR MRI Bilateral 2023    Procedure: Anesthesia out of OR MAGNETIC RESONANCE IMAGING OF BILATERAL HIPS WITHOUT CONTRAST@1400;  Surgeon: GENERIC ANESTHESIA PROVIDER;  Location: [facility] OR    APPENDECTOMY      C/SECTION, LOW TRANSVERSE      X2    CHOLECYSTECTOMY      LAPAROSCOPY      endometriosis    ORTHOPEDIC SURGERY      SALPINGECTOMY      TONSILLECTOMY      WISDOM TOOTH EXTRACTION        Allergies   Allergen Reactions    Codeine Anaphylaxis, Difficulty breathing, Hives, Itching, Other (See Comments), Rash and Shortness Of Breath     Comment: Throt swelling, Description:       Imidazole Antifungals Anaphylaxis    Metronidazole Anaphylaxis, Difficulty breathing, Hives, Itching, Other (See Comments), Rash, Shortness Of Breath and Swelling     Comment: Throat swelling, Description:   Comment: Throat swelling, Description:         Social History     Tobacco Use    Smoking status: Some Days    Smokeless tobacco: Never   Substance Use Topics    Alcohol use: Yes      "Alcohol/week: 10.0 standard drinks of alcohol     Types: 10 Standard drinks or equivalent per week      Wt Readings from Last 1 Encounters:   07/01/24 101.5 kg (223 lb 12.3 oz)        Anesthesia Evaluation   Pt has had prior anesthetic. Type: General and MAC.    History of anesthetic complications       ROS/MED HX  ENT/Pulmonary:     (+) sleep apnea,                     asthma                  Neurologic:  - neg neurologic ROS     Cardiovascular:       METS/Exercise Tolerance:     Hematologic:       Musculoskeletal: Comment: Arthritis in both knees.  (+)  arthritis,             GI/Hepatic:     (+) GERD,                   Renal/Genitourinary:       Endo:     (+)  type II DM,             Obesity,       Psychiatric/Substance Use:  - neg psychiatric ROS     Infectious Disease:  - neg infectious disease ROS     Malignancy:       Other:            Physical Exam    Airway  airway exam normal      Mallampati: II   TM distance: > 3 FB   Neck ROM: full   Mouth opening: > 3 cm    Respiratory Devices and Support         Dental       (+) Minor Abnormalities - some fillings, tiny chips      Cardiovascular   cardiovascular exam normal       Rhythm and rate: regular and normal     Pulmonary   pulmonary exam normal        breath sounds clear to auscultation           OUTSIDE LABS:  CBC:   Lab Results   Component Value Date    WBC 13.2 (H) 11/08/2023    HGB 16.0 (H) 11/08/2023    HCT 46.3 11/08/2023     11/08/2023     BMP:   Lab Results   Component Value Date     11/08/2023    POTASSIUM 4.4 11/08/2023    CHLORIDE 103 11/08/2023    CO2 23 11/08/2023    BUN 9.0 11/08/2023    CR 0.61 11/08/2023    GLC 80 11/08/2023    GLC 96 04/18/2023     COAGS: No results found for: \"PTT\", \"INR\", \"FIBR\"  POC: No results found for: \"BGM\", \"HCG\", \"HCGS\"  HEPATIC:   Lab Results   Component Value Date    ALBUMIN 4.1 11/08/2023    PROTTOTAL 6.8 11/08/2023    ALT 81 (H) 11/08/2023    AST 39 11/08/2023    ALKPHOS 71 11/08/2023    BILITOTAL 0.2 " "11/08/2023     OTHER:   Lab Results   Component Value Date    CATINA 9.2 11/08/2023    TSH 1.56 11/08/2023       Anesthesia Plan    ASA Status:  3    NPO Status:  NPO Appropriate    Anesthesia Type: General.     - Airway: ETT   Induction: Intravenous.   Maintenance: Inhalation.        Consents    Anesthesia Plan(s) and associated risks, benefits, and realistic alternatives discussed. Questions answered and patient/representative(s) expressed understanding.     - Discussed: Risks, Benefits and Alternatives for BOTH SEDATION and the PROCEDURE were discussed     - Discussed with:  Patient      - Extended Intubation/Ventilatory Support Discussed: Yes.      - Patient is DNR/DNI Status: No     Use of blood products discussed: Yes.     - Discussed with: Patient.     Postoperative Care    Pain management: IV analgesics.   PONV prophylaxis: Ondansetron (or other 5HT-3), Dexamethasone or Solumedrol     Comments:    Other Comments: Verbal informed consent was obtained for Anesthesia.  The material risks, benefits, and alternatives were discussed in detail.  The patient agrees to proceed.  The patient has no other complaints at this time.           Robbie Bearden MD    I have reviewed the pertinent notes and labs in the chart from the past 30 days and (re)examined the patient.  Any updates or changes from those notes are reflected in this note.              # Obesity: Estimated body mass index is 39.64 kg/m  as calculated from the following:    Height as of this encounter: 1.6 m (5' 3\").    Weight as of this encounter: 101.5 kg (223 lb 12.3 oz).      "

## 2024-07-01 NOTE — DISCHARGE INSTRUCTIONS
Contacting your Doctor -   To contact a doctor, call:  [phone] and ask for the resident on call for Anesthesia (answered 24 hours a day)   Emergency Department:  [facility]: [phone]  [phone] if you are in need of immediate or emergent help

## 2024-07-02 NOTE — ANESTHESIA CARE TRANSFER NOTE
Patient: Krystle Hernández    Procedure: Procedure(s):  Anesthesia out of OR Magnetic Resonance Imaging of left knee without contrast@0845       Diagnosis: Right knee pain [M25.561]  Left knee pain [M25.562]  Diagnosis Additional Information: No value filed.    Anesthesia Type:   General     Note:      Level of Consciousness: awake  Oxygen Supplementation: blow-by O2    Independent Airway: airway patency satisfactory and stable  Dentition: dentition unchanged      Patient transferred to: PACU  Comments: No complications.  Handoff Report: Identifed the Patient, Identified the Reponsible Provider, Reviewed the pertinent medical history, Discussed the surgical course, Reviewed Intra-OP anesthesia mangement and issues during anesthesia, Set expectations for post-procedure period and Allowed opportunity for questions and acknowledgement of understanding      Vitals:  Vitals Value Taken Time   /95 07/01/24 1130   Temp 36.4  C (97.5  F) 07/01/24 1130   Pulse 79 07/01/24 1136   Resp 27 07/01/24 1136   SpO2 97 % 07/01/24 1136   Vitals shown include unfiled device data.    Electronically Signed By: Robbie Bearden MD  July 2, 2024  9:56 AM

## 2024-07-04 ASSESSMENT — ACTIVITIES OF DAILY LIVING (ADL)
ADLS_ACUITY_SCORE: 35

## 2024-09-22 ENCOUNTER — HEALTH MAINTENANCE LETTER (OUTPATIENT)
Age: 44
End: 2024-09-22

## 2025-01-12 ENCOUNTER — HEALTH MAINTENANCE LETTER (OUTPATIENT)
Age: 45
End: 2025-01-12

## 2025-04-26 ENCOUNTER — HEALTH MAINTENANCE LETTER (OUTPATIENT)
Age: 45
End: 2025-04-26

## 2025-08-09 ENCOUNTER — HEALTH MAINTENANCE LETTER (OUTPATIENT)
Age: 45
End: 2025-08-09

## (undated) RX ORDER — ONDANSETRON 2 MG/ML
INJECTION INTRAMUSCULAR; INTRAVENOUS
Status: DISPENSED
Start: 2022-02-02

## (undated) RX ORDER — GLYCOPYRROLATE 0.2 MG/ML
INJECTION, SOLUTION INTRAMUSCULAR; INTRAVENOUS
Status: DISPENSED
Start: 2022-02-02

## (undated) RX ORDER — FENTANYL CITRATE 50 UG/ML
INJECTION, SOLUTION INTRAMUSCULAR; INTRAVENOUS
Status: DISPENSED
Start: 2023-04-18

## (undated) RX ORDER — ATROPINE SULFATE 0.4 MG/ML
AMPUL (ML) INJECTION
Status: DISPENSED
Start: 2022-02-02

## (undated) RX ORDER — SCOLOPAMINE TRANSDERMAL SYSTEM 1 MG/1
PATCH, EXTENDED RELEASE TRANSDERMAL
Status: DISPENSED
Start: 2023-04-18

## (undated) RX ORDER — IPRATROPIUM BROMIDE AND ALBUTEROL SULFATE 2.5; .5 MG/3ML; MG/3ML
SOLUTION RESPIRATORY (INHALATION)
Status: DISPENSED
Start: 2022-02-02

## (undated) RX ORDER — FENTANYL CITRATE 50 UG/ML
INJECTION, SOLUTION INTRAMUSCULAR; INTRAVENOUS
Status: DISPENSED
Start: 2022-02-02

## (undated) RX ORDER — LIDOCAINE HYDROCHLORIDE 10 MG/ML
INJECTION, SOLUTION EPIDURAL; INFILTRATION; INTRACAUDAL; PERINEURAL
Status: DISPENSED
Start: 2021-12-13

## (undated) RX ORDER — FENTANYL CITRATE 50 UG/ML
INJECTION, SOLUTION INTRAMUSCULAR; INTRAVENOUS
Status: DISPENSED
Start: 2024-07-01

## (undated) RX ORDER — BETAMETHASONE SODIUM PHOSPHATE AND BETAMETHASONE ACETATE 3; 3 MG/ML; MG/ML
INJECTION, SUSPENSION INTRA-ARTICULAR; INTRALESIONAL; INTRAMUSCULAR; SOFT TISSUE
Status: DISPENSED
Start: 2021-12-13

## (undated) RX ORDER — PROPOFOL 10 MG/ML
INJECTION, EMULSION INTRAVENOUS
Status: DISPENSED
Start: 2022-02-02

## (undated) RX ORDER — LORAZEPAM 1 MG/1
TABLET ORAL
Status: DISPENSED
Start: 2023-04-18

## (undated) RX ORDER — DEXAMETHASONE SODIUM PHOSPHATE 4 MG/ML
INJECTION, SOLUTION INTRA-ARTICULAR; INTRALESIONAL; INTRAMUSCULAR; INTRAVENOUS; SOFT TISSUE
Status: DISPENSED
Start: 2022-02-02